# Patient Record
Sex: MALE | Race: WHITE | NOT HISPANIC OR LATINO | Employment: FULL TIME | URBAN - NONMETROPOLITAN AREA
[De-identification: names, ages, dates, MRNs, and addresses within clinical notes are randomized per-mention and may not be internally consistent; named-entity substitution may affect disease eponyms.]

---

## 2017-02-06 ENCOUNTER — DOCUMENTATION (OUTPATIENT)
Dept: UROLOGY | Facility: CLINIC | Age: 52
End: 2017-02-06

## 2017-02-06 NOTE — PROGRESS NOTES
Patients wife called and left a message requesting refills on Testosterone for the patient, upon reviewing the patients chart, he will need to make an appointment to see a provider before anymore refills can be issued on the Testosterone. I called and left a message explaining this to them and instructing them to call the office to schedule an appointment.

## 2017-02-13 PROCEDURE — 84153 ASSAY OF PSA TOTAL: CPT | Performed by: UROLOGY

## 2017-02-13 PROCEDURE — 85027 COMPLETE CBC AUTOMATED: CPT | Performed by: UROLOGY

## 2017-02-13 PROCEDURE — 84403 ASSAY OF TOTAL TESTOSTERONE: CPT | Performed by: UROLOGY

## 2017-02-14 ENCOUNTER — LAB (OUTPATIENT)
Dept: UROLOGY | Facility: CLINIC | Age: 52
End: 2017-02-14

## 2017-02-14 DIAGNOSIS — R79.89 LOW TESTOSTERONE: Primary | ICD-10-CM

## 2017-02-14 LAB — TESTOST SERPL-MCNC: 176.58 NG/DL (ref 86.98–780.1)

## 2017-02-14 PROCEDURE — 36415 COLL VENOUS BLD VENIPUNCTURE: CPT | Performed by: NURSE PRACTITIONER

## 2017-02-14 PROCEDURE — 84403 ASSAY OF TOTAL TESTOSTERONE: CPT | Performed by: NURSE PRACTITIONER

## 2017-02-15 ENCOUNTER — TRANSCRIBE ORDERS (OUTPATIENT)
Dept: INFUSION THERAPY | Facility: HOSPITAL | Age: 52
End: 2017-02-15

## 2017-02-15 DIAGNOSIS — E83.119 HEMOCHROMATOSIS, UNSPECIFIED HEMOCHROMATOSIS TYPE: Primary | ICD-10-CM

## 2017-02-16 NOTE — PROGRESS NOTES
Patient scheduled for a therapeutic phlebotomy for elevated HCT level of 55.  Tried to notify the patient yesterday evening with no answer and attempted again today .  Left message for him to return my call.

## 2017-02-20 NOTE — PROGRESS NOTES
Have attempted call patient's wife Sylwia at [phone] today with a message for her to return my call for the patient's scheduled appointment for the [facility] on 2/21/2017 at 11:00 am.  I have also attempted the call the patient's work number today and last week and have left messages regarding the appointment with no return call received.

## 2017-02-20 NOTE — PROGRESS NOTES
Patient returned called and was informed of his appointment at the Infusion Clinic for 2/21/2017 at 11:00 am.  A prescription for testosterone cypionate 200 mg/mL - to inject one mL every 3 weeks # 1 mL with no refills called to [facility] in [place], [place]

## 2017-02-21 ENCOUNTER — HOSPITAL ENCOUNTER (OUTPATIENT)
Dept: INFUSION THERAPY | Facility: HOSPITAL | Age: 52
Setting detail: INFUSION SERIES
Discharge: HOME | End: 2017-02-21
Attending: UROLOGY

## 2017-02-21 VITALS
RESPIRATION RATE: 16 BRPM | DIASTOLIC BLOOD PRESSURE: 90 MMHG | HEART RATE: 64 BPM | TEMPERATURE: 98.8 F | SYSTOLIC BLOOD PRESSURE: 126 MMHG | WEIGHT: 280 LBS | BODY MASS INDEX: 35.94 KG/M2 | HEIGHT: 74 IN

## 2017-02-21 DIAGNOSIS — E83.119 HEMOCHROMATOSIS, UNSPECIFIED HEMOCHROMATOSIS TYPE: ICD-10-CM

## 2017-02-21 LAB
FERRITIN SERPL-MCNC: 280 NG/ML (ref 21.9–321.7)
HCT VFR BLD AUTO: 54 % (ref 42–52)
HGB BLD-MCNC: 17.7 G/DL (ref 14–18)
IRON 24H UR-MRATE: 110 MCG/DL (ref 53–167)
IRON SATN MFR SERPL: 33 % (ref 20–50)
POST-BLOOD PRESSURE: NORMAL
PRE-BLOOD PRESSURE: NORMAL
PRE-HCT: 54
PRE-HGB: 17.7
PULSE: 68
TIBC SERPL-MCNC: 333 MCG/DL (ref 241–421)
VOLUME COLLECTED: 510

## 2017-02-21 PROCEDURE — 83540 ASSAY OF IRON: CPT | Performed by: UROLOGY

## 2017-02-21 PROCEDURE — 99195 PHLEBOTOMY: CPT

## 2017-02-21 PROCEDURE — 85014 HEMATOCRIT: CPT | Performed by: UROLOGY

## 2017-02-21 PROCEDURE — 85018 HEMOGLOBIN: CPT | Performed by: UROLOGY

## 2017-02-21 PROCEDURE — 82728 ASSAY OF FERRITIN: CPT | Performed by: UROLOGY

## 2017-02-21 PROCEDURE — 83550 IRON BINDING TEST: CPT | Performed by: UROLOGY

## 2017-03-31 ENCOUNTER — LAB (OUTPATIENT)
Dept: ONCOLOGY | Facility: CLINIC | Age: 52
End: 2017-03-31

## 2017-03-31 ENCOUNTER — CONSULT (OUTPATIENT)
Dept: ONCOLOGY | Facility: CLINIC | Age: 52
End: 2017-03-31

## 2017-03-31 VITALS
DIASTOLIC BLOOD PRESSURE: 71 MMHG | WEIGHT: 295 LBS | RESPIRATION RATE: 18 BRPM | OXYGEN SATURATION: 98 % | TEMPERATURE: 97.2 F | BODY MASS INDEX: 37.86 KG/M2 | HEIGHT: 74 IN | SYSTOLIC BLOOD PRESSURE: 123 MMHG | HEART RATE: 66 BPM

## 2017-03-31 DIAGNOSIS — D72.829 LEUKOCYTOSIS, UNSPECIFIED TYPE: Primary | ICD-10-CM

## 2017-03-31 DIAGNOSIS — D75.1 POLYCYTHEMIA: Primary | ICD-10-CM

## 2017-03-31 LAB
ALBUMIN SERPL-MCNC: 4.4 G/DL (ref 3.5–5)
ALBUMIN/GLOB SERPL: 1.6 G/DL (ref 1.5–2.5)
ALP SERPL-CCNC: 44 U/L (ref 40–129)
ALT SERPL W P-5'-P-CCNC: 20 U/L (ref 10–44)
ANION GAP SERPL CALCULATED.3IONS-SCNC: 5.1 MMOL/L (ref 3.6–11.2)
AST SERPL-CCNC: 19 U/L (ref 10–34)
BASOPHILS # BLD AUTO: 0.04 10*3/MM3 (ref 0–0.3)
BASOPHILS NFR BLD AUTO: 0.5 % (ref 0–2)
BILIRUB SERPL-MCNC: 0.5 MG/DL (ref 0.2–1.8)
BUN BLD-MCNC: 16 MG/DL (ref 7–21)
BUN/CREAT SERPL: 14.2 (ref 7–25)
CALCIUM SPEC-SCNC: 9.2 MG/DL (ref 7.7–10)
CHLORIDE SERPL-SCNC: 108 MMOL/L (ref 99–112)
CO2 SERPL-SCNC: 25.9 MMOL/L (ref 24.3–31.9)
CREAT BLD-MCNC: 1.13 MG/DL (ref 0.43–1.29)
DEPRECATED RDW RBC AUTO: 49.2 FL (ref 37–54)
EOSINOPHIL # BLD AUTO: 0.25 10*3/MM3 (ref 0–0.7)
EOSINOPHIL NFR BLD AUTO: 2.9 % (ref 0–5)
ERYTHROCYTE [DISTWIDTH] IN BLOOD BY AUTOMATED COUNT: 14.4 % (ref 11.5–14.5)
GFR SERPL CREATININE-BSD FRML MDRD: 68 ML/MIN/1.73
GLOBULIN UR ELPH-MCNC: 2.8 GM/DL
GLUCOSE BLD-MCNC: 131 MG/DL (ref 70–110)
HCT VFR BLD AUTO: 47.9 % (ref 42–52)
HGB BLD-MCNC: 15.7 G/DL (ref 14–18)
IMM GRANULOCYTES # BLD: 0.14 10*3/MM3 (ref 0–0.03)
IMM GRANULOCYTES NFR BLD: 1.6 % (ref 0–0.5)
LYMPHOCYTES # BLD AUTO: 2.09 10*3/MM3 (ref 1–3)
LYMPHOCYTES NFR BLD AUTO: 24.4 % (ref 21–51)
MCH RBC QN AUTO: 31 PG (ref 27–33)
MCHC RBC AUTO-ENTMCNC: 32.8 G/DL (ref 33–37)
MCV RBC AUTO: 94.5 FL (ref 80–94)
MONOCYTES # BLD AUTO: 1.05 10*3/MM3 (ref 0.1–0.9)
MONOCYTES NFR BLD AUTO: 12.2 % (ref 0–10)
NEUTROPHILS # BLD AUTO: 5.01 10*3/MM3 (ref 1.4–6.5)
NEUTROPHILS NFR BLD AUTO: 58.4 % (ref 30–70)
OSMOLALITY SERPL CALC.SUM OF ELEC: 280.5 MOSM/KG (ref 273–305)
PLATELET # BLD AUTO: 218 10*3/MM3 (ref 130–400)
PMV BLD AUTO: 10.9 FL (ref 6–10)
POTASSIUM BLD-SCNC: 3.9 MMOL/L (ref 3.5–5.3)
PROT SERPL-MCNC: 7.2 G/DL (ref 6–8)
RBC # BLD AUTO: 5.07 10*6/MM3 (ref 4.7–6.1)
SODIUM BLD-SCNC: 139 MMOL/L (ref 135–153)
WBC NRBC COR # BLD: 8.58 10*3/MM3 (ref 4.5–12.5)

## 2017-03-31 PROCEDURE — 81403 MOPATH PROCEDURE LEVEL 4: CPT | Performed by: INTERNAL MEDICINE

## 2017-03-31 PROCEDURE — 82668 ASSAY OF ERYTHROPOIETIN: CPT | Performed by: INTERNAL MEDICINE

## 2017-03-31 PROCEDURE — 99205 OFFICE O/P NEW HI 60 MIN: CPT | Performed by: INTERNAL MEDICINE

## 2017-03-31 PROCEDURE — 36415 COLL VENOUS BLD VENIPUNCTURE: CPT | Performed by: INTERNAL MEDICINE

## 2017-03-31 PROCEDURE — 80053 COMPREHEN METABOLIC PANEL: CPT | Performed by: INTERNAL MEDICINE

## 2017-03-31 PROCEDURE — 85025 COMPLETE CBC W/AUTO DIFF WBC: CPT | Performed by: INTERNAL MEDICINE

## 2017-03-31 PROCEDURE — 81270 JAK2 GENE: CPT | Performed by: INTERNAL MEDICINE

## 2017-03-31 PROCEDURE — 81219 CALR GENE COM VARIANTS: CPT | Performed by: INTERNAL MEDICINE

## 2017-03-31 PROCEDURE — 81402 MOPATH PROCEDURE LEVEL 3: CPT | Performed by: INTERNAL MEDICINE

## 2017-03-31 RX ORDER — NAPROXEN 500 MG/1
500 TABLET ORAL 2 TIMES DAILY PRN
COMMUNITY
End: 2022-08-05 | Stop reason: ALTCHOICE

## 2017-03-31 RX ORDER — LANOLIN ALCOHOL/MO/W.PET/CERES
1000 CREAM (GRAM) TOPICAL WEEKLY
COMMUNITY
End: 2018-12-31

## 2017-03-31 RX ORDER — PRASTERONE (DHEA) 50 MG
TABLET ORAL 3 TIMES WEEKLY
COMMUNITY
End: 2018-12-31

## 2017-03-31 NOTE — PROGRESS NOTES
Jose R Hilliard  [id number]  1965  3/31/2017      Referring Provider:   GABRIEL Guillen    Reason for Consultation:   Polycythemia     Chief Complaint:  Chronic joint/back pain      History of Present Illness:  Jose R Hilliard is a very pleasant 51 y.o.  male who presents in new consultation at the request of GABRIEL Guillen for further management and evaluation of polycythemia.    Mr. Hilliard was first found to have an erythrocytosis one month ago in which his hemoglobin was 17.7 with a hematocrit of 55.2. He underwent his first phlebotomy treatment on 2/21/17. He reports feeling overall better with phlebotomy. In reviewing his complete blood counts that are available to me it appears that he was also had an erythrocytosis in 2016. He denies of any tobacco abuse or being exposed to second hand tobacco smoke, but he is a  by trade and is exposed to a lot of smoke however he does where a nair. He has been taking testosterone injections and recently has been taking this more frequently. he was also recently placed on DHEA in the last two weeks. He reports that he has overall been relatively healthy with the exception of his chronic joint pain. However, in the last year he has been diagnosed with mild hypertension, hypothyroidism and has had laboratory results that have been concerning for CREST syndrome without clinical symptoms. He currently follows with a Rheumatologist. He denies of any fevers, night sweats, weight changes, or lymphadenopathy. He denies of any abnormal or spontaneous bleeding or thrombosis. He denies of any headaches, snoring, or sleep apnea symptoms.      The following portions of the patient's history were reviewed and updated as appropriate: allergies, current medications, past family history, past medical history, past social history, past surgical history and problem list.    Allergies   Allergen Reactions   • Adhesive Tape Rash     Some tapes       Past Medical History:    Diagnosis Date   • BPH (benign prostatic hypertrophy) with urinary obstruction    • Disease of thyroid gland    • History of hyperlipidemia    • History of hypertension    • History of osteoarthritis    • Hypogonadism in male        Past Surgical History:   Procedure Laterality Date   • CHOLECYSTECTOMY         Social History     Social History   • Marital status: Single     Spouse name: N/A   • Number of children: N/A   • Years of education: N/A     Occupational History   • Not on file.     Social History Main Topics   • Smoking status: Never Smoker   • Smokeless tobacco: Never Used   • Alcohol use No   • Drug use: No   • Sexual activity: Not on file     Other Topics Concern   • Not on file     Social History Narrative       Family History   Problem Relation Age of Onset   • No Known Problems Father    • No Known Problems Mother    • Stroke Paternal Grandfather    • Diabetes Paternal Grandfather          Current Outpatient Prescriptions:   •  aspirin ( ASPIRIN) 81 MG EC tablet, Take 1 tablet by mouth daily, Disp: 30 tablet, Rfl: 2  •  bisoprolol-hydrochlorothiazide (ZIAC) 5-6.25 MG per tablet, Take 1 tablet by mouth daily, Disp: 30 tablet, Rfl: 2  •  DHEA 50 MG tablet, Take  by mouth 3 (Three) Times a Week., Disp: , Rfl:   •  levothyroxine (SYNTHROID, LEVOTHROID) 88 MCG tablet, Take 1 tablet by mouth daily, Disp: 30 tablet, Rfl: 2  •  naproxen (NAPROSYN) 500 MG tablet, Take 500 mg by mouth 2 (Two) Times a Day With Meals., Disp: , Rfl:   •  pantoprazole (PROTONIX) 40 MG EC tablet, Take 1 tablet by mouth daily, Disp: 30 tablet, Rfl: 2  •  Testosterone Cypionate (DEPOTESTOTERONE CYPIONATE) 200 MG/ML injection, Inject 1 mL intramuscularly every 3 weeks., Disp: 1 mL, Rfl: 3  •  vitamin B-12 (CYANOCOBALAMIN) 1000 MCG tablet, Take 1,000 mcg by mouth 1 (One) Time Per Week., Disp: , Rfl:         Review of Systems  Constitutional: No fever, chills, night sweats or weight loss.   HEENT:  No headaches, vision changes or  hearing changes, +sinus drainage, no sore throat.   Cardiovascular:  No palpitations, chest pain, syncopal episodes or edema.   Pulmonary:  No shortness of breath, hemoptysis, or cough.   Gastrointestinal:  No nausea or vomiting.  No constipation or diarrhea. No change in appetite.No abdominal pain. No melena or hematochezia.   Genitourinary:  No hematuria, or changes in urination.   Musculoskeletal:  +chronic joint problems and pain.   Skin: No rashes or pruritus.   Endocrine:  No hot flashes or chills   Hematologic:  No history of free bleeding, spontaneous bleeding or clotting problems. No lymphadenopathy.    Immunologic:  + seasonal allergies or no frequent infections.   Neurologic: +numbness, tingling, np weakness.   Psychiatric:  No anxiety or depression.       Physical Exam  Vital Signs: These were reviewed and listed as per patient’s electronic medical chart  Vitals:    03/31/17 1230   BP: 123/71   Pulse: 66   Resp: 18   Temp: 97.2 °F (36.2 °C)   SpO2: 98%     General: Awake, alert and oriented, in no distress  HEENT: Head is atraumatic, normocephalic, extraocular movements full, oropharynx clear, no scleral icterus, pink moist mucous membranes  Neck: supple, no jvd, lymphadenopathy or masses  Cardiovascular: regular rate and rhythm without murmurs, rubs or gallops  Pulmonary: non-labored, clear to auscultation bilaterally, no wheezing  Abdomen: soft, non-tender, non-distended, normal active bowel sounds present, no organomegaly  Extremities: No clubbing, cyanosis or edema  Lymph: No cervical, supraclavicular, axillary, adenopathy  Neurologic: Mental status as above, alert, awake and oriented, grossly non-focal exam  Skin: warm, dry, intact        Labs / Studies:    Consult on 03/31/2017   Component Date Value   • WBC 03/31/2017 8.58    • RBC 03/31/2017 5.07    • Hemoglobin 03/31/2017 15.7    • Hematocrit 03/31/2017 47.9    • MCV 03/31/2017 94.5*   • MCH 03/31/2017 31.0    • MCHC 03/31/2017 32.8*   • RDW  03/31/2017 14.4    • RDW-SD 03/31/2017 49.2    • MPV 03/31/2017 10.9*   • Platelets 03/31/2017 218    • Neutrophil % 03/31/2017 58.4    • Lymphocyte % 03/31/2017 24.4    • Monocyte % 03/31/2017 12.2*   • Eosinophil % 03/31/2017 2.9    • Basophil % 03/31/2017 0.5    • Immature Grans % 03/31/2017 1.6*   • Neutrophils, Absolute 03/31/2017 5.01    • Lymphocytes, Absolute 03/31/2017 2.09    • Monocytes, Absolute 03/31/2017 1.05*   • Eosinophils, Absolute 03/31/2017 0.25    • Basophils, Absolute 03/31/2017 0.04    • Immature Grans, Absolute 03/31/2017 0.14*   Hospital Outpatient Visit on 02/21/2017   Component Date Value   • Hemoglobin 02/21/2017 17.7    • Hematocrit 02/21/2017 54.0*   • Iron 02/21/2017 110    • TIBC 02/21/2017 333    • Iron Saturation 02/21/2017 33    • Ferritin 02/21/2017 280.00    • Pulse 02/21/2017 68    • Volume Collected 02/21/2017 510    • Pre-Hgb 02/21/2017 17.7    • Pre-Hct 02/21/2017 54.0    • Pre-Blood Pressure 02/21/2017 137/77    • Post-Blood Pressure 02/21/2017 126/90    Lab on 02/14/2017   Component Date Value   • Testosterone, Total 02/14/2017 176.58    Lab on 02/13/2017   Component Date Value   • PSA 02/13/2017 4.490*   • Testosterone, Total 02/13/2017 210.86    • WBC 02/13/2017 9.47    • RBC 02/13/2017 5.82    • Hemoglobin 02/13/2017 17.9    • Hematocrit 02/13/2017 55.2*   • MCV 02/13/2017 94.8*   • MCH 02/13/2017 30.8    • MCHC 02/13/2017 32.4*   • RDW 02/13/2017 14.6*   • RDW-SD 02/13/2017 48.5    • MPV 02/13/2017 10.9*   • Platelets 02/13/2017 222         * Cannot find OR log *       Assessment/Plan   Jose R Hilliard is a very pleasant 51 y.o.  male who presents in new consultation at the request of GABRIEL Guillen for further management and evaluation of polycythemia.    Polycythemia  Given his significant elevation in hemoglobin and hematocrit I will start by obtaining a [id number] mutation and erythropoietin level. If [id number] mutation and erythropoietin results are  unrevealing, will obtain JAK2 exon 12 mutation and BCR ABL PCR. Possible polycythemia vera diagnosis as well as prognosis, and treatment were discussed with the patient. He understands that if he does have polycythemia vera he is at increased risk for thrombosis as well as bleeding, hematological malignancies and post-PV myelofibrosis. However with that being said I believe that he more likely then not has a secondary polycythemia which is being caused by his ongoing testosterone use. He last received phlebotomy one month ago and today his hematocrit level does not require phlebotomy. I will continue with phlebotomy for one unit (500 mL) to be taken every 1 month with goal of Hct  <50% if patient is positive for polycythemia vera will then increase goals to Hct <45%.      Possible CREST syndrome  He is currently following with a Rheumatologist, although he reports clinically he has not manifested symptoms despite laboratory evaluation that may be consistent with CREST.    I will have the patient return in follow up appointment to review test results in one month and for repeat phlebotomy. He understands that should he have any questions or concerns prior to his appointment he should give us a call at any time and I would be happy to see him sooner. It was a pleasure to see this patient in clinic today, thank you for allowing me to participate in the care of this patient.    I spent 60 minutes in regards to this patient’s care today. More than 36 minutes of the time was spent in direct interaction with the patient and his discussing the above problems and answering their questions.        Yudy Dietz MD  03/31/2017  1:38 PM

## 2017-04-03 LAB — ETHNIC BACKGROUND STATED: 32.9 MIU/ML (ref 2.6–18.5)

## 2017-04-13 LAB
CALR EXON 9 MUT ANL BLD/T: NORMAL
JAK2 EXON 12 MUT ANL BLD/T: NORMAL
JAK2 EXON 12 MUT TESTED BLD/T: NORMAL
JAK2 P.V617F BLD/T QL: NORMAL
LAB DIRECTOR NAME PROVIDER: NORMAL
Lab: NORMAL
MPL MUTATION ANALYSIS RESULT:: NORMAL
REF LAB TEST METHOD: NORMAL
REF LAB TEST METHOD: NORMAL
REFERENCE: NORMAL
REFLEX: NORMAL
SERVICE CMNT-IMP: NORMAL

## 2017-07-28 ENCOUNTER — TRANSCRIBE ORDERS (OUTPATIENT)
Dept: LAB | Facility: HOSPITAL | Age: 52
End: 2017-07-28

## 2017-07-28 ENCOUNTER — HOSPITAL ENCOUNTER (OUTPATIENT)
Dept: GENERAL RADIOLOGY | Facility: HOSPITAL | Age: 52
Discharge: HOME | End: 2017-07-28
Admitting: NURSE PRACTITIONER

## 2017-07-28 DIAGNOSIS — M54.5 LOW BACK PAIN, UNSPECIFIED BACK PAIN LATERALITY, UNSPECIFIED CHRONICITY, WITH SCIATICA PRESENCE UNSPECIFIED: Primary | ICD-10-CM

## 2017-07-28 DIAGNOSIS — M54.5 LOW BACK PAIN, UNSPECIFIED BACK PAIN LATERALITY, UNSPECIFIED CHRONICITY, WITH SCIATICA PRESENCE UNSPECIFIED: ICD-10-CM

## 2017-07-28 PROCEDURE — 72110 X-RAY EXAM L-2 SPINE 4/>VWS: CPT | Performed by: RADIOLOGY

## 2017-07-28 PROCEDURE — 72110 X-RAY EXAM L-2 SPINE 4/>VWS: CPT

## 2017-08-18 ENCOUNTER — TRANSCRIBE ORDERS (OUTPATIENT)
Dept: ADMINISTRATIVE | Facility: HOSPITAL | Age: 52
End: 2017-08-18

## 2017-08-18 DIAGNOSIS — R13.10 DYSPHAGIA, UNSPECIFIED TYPE: ICD-10-CM

## 2017-08-18 DIAGNOSIS — E03.9 HYPOTHYROIDISM, UNSPECIFIED TYPE: Primary | ICD-10-CM

## 2017-08-30 ENCOUNTER — APPOINTMENT (OUTPATIENT)
Dept: ULTRASOUND IMAGING | Facility: HOSPITAL | Age: 52
End: 2017-08-30

## 2017-09-08 ENCOUNTER — HOSPITAL ENCOUNTER (OUTPATIENT)
Dept: ULTRASOUND IMAGING | Facility: HOSPITAL | Age: 52
Discharge: HOME | End: 2017-09-08
Admitting: NURSE PRACTITIONER

## 2017-09-08 DIAGNOSIS — R13.10 DYSPHAGIA, UNSPECIFIED TYPE: ICD-10-CM

## 2017-09-08 DIAGNOSIS — E03.9 HYPOTHYROIDISM, UNSPECIFIED TYPE: ICD-10-CM

## 2017-09-08 PROCEDURE — 76536 US EXAM OF HEAD AND NECK: CPT | Performed by: RADIOLOGY

## 2017-09-08 PROCEDURE — 76536 US EXAM OF HEAD AND NECK: CPT

## 2018-08-31 ENCOUNTER — HOSPITAL ENCOUNTER (OUTPATIENT)
Dept: RESPIRATORY THERAPY | Facility: HOSPITAL | Age: 53
Discharge: HOME | End: 2018-08-31

## 2018-08-31 ENCOUNTER — TRANSCRIBE ORDERS (OUTPATIENT)
Dept: LAB | Facility: HOSPITAL | Age: 53
End: 2018-08-31

## 2018-08-31 ENCOUNTER — HOSPITAL ENCOUNTER (OUTPATIENT)
Dept: GENERAL RADIOLOGY | Facility: HOSPITAL | Age: 53
Discharge: HOME | End: 2018-08-31
Admitting: NURSE PRACTITIONER

## 2018-08-31 DIAGNOSIS — K21.9 CARDIOCHALASIA: ICD-10-CM

## 2018-08-31 DIAGNOSIS — M54.40 LOW BACK PAIN WITH SCIATICA, SCIATICA LATERALITY UNSPECIFIED, UNSPECIFIED BACK PAIN LATERALITY, UNSPECIFIED CHRONICITY: Primary | ICD-10-CM

## 2018-08-31 DIAGNOSIS — I10 ESSENTIAL HYPERTENSION, BENIGN: ICD-10-CM

## 2018-08-31 DIAGNOSIS — M54.40 LOW BACK PAIN WITH SCIATICA, SCIATICA LATERALITY UNSPECIFIED, UNSPECIFIED BACK PAIN LATERALITY, UNSPECIFIED CHRONICITY: ICD-10-CM

## 2018-08-31 DIAGNOSIS — I10 ESSENTIAL HYPERTENSION, BENIGN: Primary | ICD-10-CM

## 2018-08-31 PROCEDURE — 72110 X-RAY EXAM L-2 SPINE 4/>VWS: CPT | Performed by: RADIOLOGY

## 2018-08-31 PROCEDURE — 93226 XTRNL ECG REC<48 HR SCAN A/R: CPT

## 2018-08-31 PROCEDURE — 72110 X-RAY EXAM L-2 SPINE 4/>VWS: CPT

## 2018-08-31 PROCEDURE — 93225 XTRNL ECG REC<48 HRS REC: CPT

## 2018-09-04 PROCEDURE — 93227 XTRNL ECG REC<48 HR R&I: CPT | Performed by: INTERNAL MEDICINE

## 2018-12-28 ENCOUNTER — TRANSCRIBE ORDERS (OUTPATIENT)
Dept: INFUSION THERAPY | Facility: HOSPITAL | Age: 53
End: 2018-12-28

## 2018-12-28 DIAGNOSIS — D75.1 POLYCYTHEMIA: Primary | ICD-10-CM

## 2018-12-31 ENCOUNTER — HOSPITAL ENCOUNTER (OUTPATIENT)
Dept: INFUSION THERAPY | Facility: HOSPITAL | Age: 53
Discharge: HOME | End: 2018-12-31
Admitting: NURSE PRACTITIONER

## 2018-12-31 VITALS
RESPIRATION RATE: 20 BRPM | HEART RATE: 59 BPM | SYSTOLIC BLOOD PRESSURE: 119 MMHG | DIASTOLIC BLOOD PRESSURE: 82 MMHG | TEMPERATURE: 98.6 F

## 2018-12-31 DIAGNOSIS — D75.1 POLYCYTHEMIA: ICD-10-CM

## 2018-12-31 LAB
HCT VFR BLD AUTO: 46.6 % (ref 42–52)
HGB BLD-MCNC: 15.7 G/DL (ref 14–18)

## 2018-12-31 PROCEDURE — 85018 HEMOGLOBIN: CPT | Performed by: NURSE PRACTITIONER

## 2018-12-31 PROCEDURE — 36415 COLL VENOUS BLD VENIPUNCTURE: CPT

## 2018-12-31 PROCEDURE — 85014 HEMATOCRIT: CPT | Performed by: NURSE PRACTITIONER

## 2018-12-31 RX ORDER — PHENOL 1.4 %
600 AEROSOL, SPRAY (ML) MUCOUS MEMBRANE DAILY
COMMUNITY
End: 2022-08-05 | Stop reason: ALTCHOICE

## 2020-10-16 ENCOUNTER — TRANSCRIBE ORDERS (OUTPATIENT)
Dept: ADMINISTRATIVE | Facility: HOSPITAL | Age: 55
End: 2020-10-16

## 2020-10-16 ENCOUNTER — LAB (OUTPATIENT)
Dept: LAB | Facility: HOSPITAL | Age: 55
End: 2020-10-16

## 2020-10-16 DIAGNOSIS — M79.10 MYALGIA: ICD-10-CM

## 2020-10-16 DIAGNOSIS — D89.89 RADIATION CHIMERA (HCC): ICD-10-CM

## 2020-10-16 DIAGNOSIS — M54.50 LOW BACK PAIN, UNSPECIFIED BACK PAIN LATERALITY, UNSPECIFIED CHRONICITY, UNSPECIFIED WHETHER SCIATICA PRESENT: ICD-10-CM

## 2020-10-16 DIAGNOSIS — D89.89 RADIATION CHIMERA (HCC): Primary | ICD-10-CM

## 2020-10-16 PROCEDURE — 84550 ASSAY OF BLOOD/URIC ACID: CPT

## 2020-10-16 PROCEDURE — 36415 COLL VENOUS BLD VENIPUNCTURE: CPT | Performed by: INTERNAL MEDICINE

## 2020-10-16 PROCEDURE — 86140 C-REACTIVE PROTEIN: CPT | Performed by: INTERNAL MEDICINE

## 2020-10-16 PROCEDURE — 82550 ASSAY OF CK (CPK): CPT

## 2020-10-16 PROCEDURE — 85652 RBC SED RATE AUTOMATED: CPT | Performed by: INTERNAL MEDICINE

## 2020-10-16 PROCEDURE — 81374 HLA I TYPING 1 ANTIGEN LR: CPT

## 2020-10-17 LAB
CK SERPL-CCNC: 168 U/L (ref 20–200)
CRP SERPL-MCNC: 0.59 MG/DL (ref 0–0.5)
ERYTHROCYTE [SEDIMENTATION RATE] IN BLOOD: 5 MM/HR (ref 0–20)
URATE SERPL-MCNC: 7.2 MG/DL (ref 3.4–7)

## 2020-10-22 LAB — HLA-B27 QL NAA+PROBE: NEGATIVE

## 2021-06-11 ENCOUNTER — LAB (OUTPATIENT)
Dept: LAB | Facility: HOSPITAL | Age: 56
End: 2021-06-11

## 2021-06-11 ENCOUNTER — TRANSCRIBE ORDERS (OUTPATIENT)
Dept: LAB | Facility: HOSPITAL | Age: 56
End: 2021-06-11

## 2021-06-11 DIAGNOSIS — M05.79 SEROPOSITIVE RHEUMATOID ARTHRITIS OF MULTIPLE SITES (HCC): ICD-10-CM

## 2021-06-11 DIAGNOSIS — M05.79 SEROPOSITIVE RHEUMATOID ARTHRITIS OF MULTIPLE SITES (HCC): Primary | ICD-10-CM

## 2021-06-11 LAB
CRP SERPL-MCNC: 0.69 MG/DL (ref 0–0.5)
ERYTHROCYTE [SEDIMENTATION RATE] IN BLOOD: 26 MM/HR (ref 0–20)
URATE SERPL-MCNC: 7.7 MG/DL (ref 3.4–7)

## 2021-06-11 PROCEDURE — 86140 C-REACTIVE PROTEIN: CPT

## 2021-06-11 PROCEDURE — 85652 RBC SED RATE AUTOMATED: CPT

## 2021-06-11 PROCEDURE — 84550 ASSAY OF BLOOD/URIC ACID: CPT

## 2021-06-11 PROCEDURE — 36415 COLL VENOUS BLD VENIPUNCTURE: CPT

## 2021-08-24 DIAGNOSIS — M25.512 LEFT SHOULDER PAIN, UNSPECIFIED CHRONICITY: Primary | ICD-10-CM

## 2021-08-27 ENCOUNTER — HOSPITAL ENCOUNTER (OUTPATIENT)
Dept: GENERAL RADIOLOGY | Facility: HOSPITAL | Age: 56
Discharge: HOME | End: 2021-08-27
Admitting: PHYSICIAN ASSISTANT

## 2021-08-27 ENCOUNTER — OFFICE VISIT (OUTPATIENT)
Dept: ORTHOPEDIC SURGERY | Facility: CLINIC | Age: 56
End: 2021-08-27

## 2021-08-27 VITALS
HEART RATE: 67 BPM | DIASTOLIC BLOOD PRESSURE: 93 MMHG | BODY MASS INDEX: 37.91 KG/M2 | SYSTOLIC BLOOD PRESSURE: 153 MMHG | WEIGHT: 295.42 LBS | HEIGHT: 74 IN

## 2021-08-27 DIAGNOSIS — M25.512 LEFT SHOULDER PAIN, UNSPECIFIED CHRONICITY: ICD-10-CM

## 2021-08-27 DIAGNOSIS — M75.42 IMPINGEMENT SYNDROME OF LEFT SHOULDER: Primary | ICD-10-CM

## 2021-08-27 PROCEDURE — 99203 OFFICE O/P NEW LOW 30 MIN: CPT | Performed by: PHYSICIAN ASSISTANT

## 2021-08-27 PROCEDURE — 73030 X-RAY EXAM OF SHOULDER: CPT | Performed by: RADIOLOGY

## 2021-08-27 PROCEDURE — 73030 X-RAY EXAM OF SHOULDER: CPT

## 2021-08-27 PROCEDURE — 20610 DRAIN/INJ JOINT/BURSA W/O US: CPT | Performed by: PHYSICIAN ASSISTANT

## 2021-08-27 RX ORDER — GABAPENTIN 600 MG/1
TABLET ORAL
COMMUNITY
Start: 2021-08-20 | End: 2023-01-13 | Stop reason: SDUPTHER

## 2021-08-27 RX ORDER — LEFLUNOMIDE 20 MG/1
20 TABLET ORAL DAILY
COMMUNITY
Start: 2021-08-11

## 2021-08-27 RX ORDER — MELOXICAM 7.5 MG/1
TABLET ORAL
COMMUNITY
Start: 2021-07-19 | End: 2022-03-11 | Stop reason: ALTCHOICE

## 2021-08-27 RX ORDER — LIDOCAINE HYDROCHLORIDE 10 MG/ML
5 INJECTION, SOLUTION EPIDURAL; INFILTRATION; INTRACAUDAL; PERINEURAL
Status: COMPLETED | OUTPATIENT
Start: 2021-08-27 | End: 2021-08-27

## 2021-08-27 RX ORDER — METHYLPREDNISOLONE ACETATE 80 MG/ML
80 INJECTION, SUSPENSION INTRA-ARTICULAR; INTRALESIONAL; INTRAMUSCULAR; SOFT TISSUE
Status: COMPLETED | OUTPATIENT
Start: 2021-08-27 | End: 2021-08-27

## 2021-08-27 RX ADMIN — METHYLPREDNISOLONE ACETATE 80 MG: 80 INJECTION, SUSPENSION INTRA-ARTICULAR; INTRALESIONAL; INTRAMUSCULAR; SOFT TISSUE at 10:57

## 2021-08-27 RX ADMIN — LIDOCAINE HYDROCHLORIDE 5 ML: 10 INJECTION, SOLUTION EPIDURAL; INFILTRATION; INTRACAUDAL; PERINEURAL at 10:57

## 2021-08-27 NOTE — PROGRESS NOTES
[facility]          Patient: Jose R Hilliard  YOB: 1965  Date of Encounter: 08/27/2021  PCP: Edith Sloan APRN      Subjective     Chief Complaint   Patient presents with   • Left Shoulder - Pain, Initial Evaluation           History of Present Illness:     Jose R Hilliard is a 56 y.o. male presents today as result of left shoulder pain ongoing for several years.  Patient states that he was told that he had shoulder instability in the past.  The patient reports that he has attempted anti-inflammatory medication with naproxen, meloxicam with ongoing lateral and anterior shoulder pain worse upon attempted range of motion or overhead range of motion.  Patient reports weakness upon abduction of the shoulder.  He does not recall specific injury however his work occupation is very physical.  Patient describes difficulty upon rotation of the shoulder and describes dull throbbing aching sensation at rest with sharp stabbing sensation radiating down to the mid upper arm upon attempted range of motion.  Patient denies any current paresthesias.        There is no problem list on file for this patient.    Past Medical History:   Diagnosis Date   • BPH (benign prostatic hypertrophy) with urinary obstruction    • Disease of thyroid gland    • Goiter    • History of hyperlipidemia    • History of hypertension    • History of osteoarthritis    • Hypogonadism in male    • Polycythemia      Past Surgical History:   Procedure Laterality Date   • CHOLECYSTECTOMY       Social History     Occupational History   • Not on file   Tobacco Use   • Smoking status: Never Smoker   • Smokeless tobacco: Never Used   Substance and Sexual Activity   • Alcohol use: No   • Drug use: No   • Sexual activity: Not on file    Jose R Hilliard  reports that he has never smoked. He has never used smokeless tobacco.. I have educated him on the risk of diseases from using tobacco products such as cancer, COPD and heart  disease.          Social History     Social History Narrative   • Not on file     Family History   Problem Relation Age of Onset   • No Known Problems Father    • No Known Problems Mother    • Stroke Paternal Grandfather    • Diabetes Paternal Grandfather      Current Outpatient Medications   Medication Sig Dispense Refill   • aspirin (KP ASPIRIN) 81 MG EC tablet Take 1 tablet by mouth daily 30 tablet 2   • bisoprolol-hydrochlorothiazide (ZIAC) 5-6.25 MG per tablet Take 1 tablet by mouth daily 30 tablet 2   • gabapentin (NEURONTIN) 600 MG tablet      • leflunomide (ARAVA) 10 MG tablet      • levothyroxine (SYNTHROID, LEVOTHROID) 88 MCG tablet Take 1 tablet by mouth daily 30 tablet 2   • magnesium oxide (MAGOX) 400 (241.3 Mg) MG tablet tablet Take 400 mg by mouth Daily.     • meloxicam (MOBIC) 7.5 MG tablet      • naproxen (NAPROSYN) 500 MG tablet Take 500 mg by mouth 2 (Two) Times a Day As Needed.     • Testosterone Cypionate (DEPOTESTOTERONE CYPIONATE) 200 MG/ML injection Inject 1 mL intramuscularly every 3 weeks. (Patient taking differently: Inject 200 mg into the appropriate muscle as directed by prescriber. Hasn't been taking it currently) 1 mL 3   • calcium carbonate (OS-YUNIEL) 600 MG tablet Take 600 mg by mouth Daily.       No current facility-administered medications for this visit.     Allergies   Allergen Reactions   • Adhesive Tape Rash     Some tapes            Review of Systems   Constitutional: Negative.   HENT: Negative.         Sinus pain   Eyes: Negative.    Cardiovascular: Negative.    Respiratory: Negative.    Endocrine: Negative.    Hematologic/Lymphatic: Negative.    Skin: Negative.    Musculoskeletal: Positive for back pain, joint pain, joint swelling and neck pain.        Pertinent positives listed in HPI   Gastrointestinal: Negative.    Genitourinary: Negative.    Neurological: Negative.    Psychiatric/Behavioral: Negative.    Allergic/Immunologic: Negative.          Objective      Vitals:     "08/27/21 0957   BP: 153/93   Pulse: 67   Weight: 134 kg (295 lb 6.7 oz)   Height: 188 cm (74.02\")      Patient's Body mass index is 37.91 kg/m². indicating that he is obese (BMI >30). Obesity-related health conditions include the following: Listed in PMH. Obesity is unchanged. BMI is is above average; BMI management plan is completed. We discussed portion control and increasing exercise..      Physical Exam  Vitals and nursing note reviewed.   Constitutional:       General: He is not in acute distress.     Appearance: Normal appearance. He is not ill-appearing.   HENT:      Head: Normocephalic and atraumatic.      Right Ear: External ear normal.      Left Ear: External ear normal.      Nose: Nose normal.      Mouth/Throat:      Mouth: Mucous membranes are moist.      Pharynx: Oropharynx is clear.   Eyes:      Extraocular Movements: Extraocular movements intact.      Conjunctiva/sclera: Conjunctivae normal.      Pupils: Pupils are equal, round, and reactive to light.   Cardiovascular:      Rate and Rhythm: Normal rate.      Pulses: Normal pulses.   Pulmonary:      Effort: Pulmonary effort is normal.   Abdominal:      General: There is no distension.   Musculoskeletal:      Cervical back: Normal range of motion. No rigidity.      Comments: Examination of the patient's left shoulder reveals pain to palpation to the anterior bicipital groove as well as subacromially.  Patient has AC joint tenderness palpation.  Forward flexion/elevation 100 degrees.  Abduction 70 degrees.  Internal rotation to the iliac crest.  Patient has painful and weak Jobes maneuver with empty can test positive.  Gee and Neer's painful.   Skin:     General: Skin is warm and dry.      Capillary Refill: Capillary refill takes less than 2 seconds.   Neurological:      General: No focal deficit present.      Mental Status: He is alert and oriented to person, place, and time.      Cranial Nerves: Cranial nerves are intact.   Psychiatric:         " Mood and Affect: Mood normal.         Behavior: Behavior normal.                 Radiology:      XR Shoulder 2+ View Left    Result Date: 8/27/2021  Negative plain films of left shoulder  This report was finalized on 8/27/2021 10:23 AM by Dr. Bolivar Castillo II, MD.              Assessment/Plan        ICD-10-CM ICD-9-CM   1. Impingement syndrome of left shoulder  M75.42 726.2       56-year-old male with longstanding chronic left shoulder pain with recent exacerbation injury following fall injury approximately 1 month ago.  Secondary to the patient's pain and symptoms there is concern for rotator cuff pathology.  Uncertainty in reference to subacute versus acute nature.  As result the patient was provided with a diagnostic and therapeutic injection subacromially with Depo-Medrol and lidocaine block into the subacromial space of the left shoulder.  The patient tolerated the procedure well.  He was instructed to return back in 3 weeks for further evaluation.  If no significant improvement or continued weakness the patient will undergo diagnostic MRI.      Large Joint Arthrocentesis: L subacromial bursa  Date/Time: 8/27/2021 10:57 AM  Consent given by: patient  Site marked: site marked  Supporting Documentation  Indications: pain and diagnostic evaluation   Procedure Details  Location: shoulder - L subacromial bursa  Needle size: 25 G  Approach: lateral  Medications administered: 80 mg methylPREDNISolone acetate 80 MG/ML; 5 mL lidocaine PF 1% 1 %  Patient tolerance: patient tolerated the procedure well with no immediate complications                      This document was signed by Jay Roy PA-C August 27, 2021     CC: Edith Sloan APRN EMR Dragon/Transcription disclaimer:  Part of this note may be completed utilizing the dragon speech recognition software. This electronic transcription/translation of spoken language to printed text may contain grammatical errors, random word insertions, pronoun errors,  and incomplete sentences or occasional consequences of the system due to software limitations, ambient noise, and hardware issues.  Any questions or concerns about the content, text, or information contained within the body of this dictation should be directly addressed to the physician for clarification.

## 2021-09-24 ENCOUNTER — OFFICE VISIT (OUTPATIENT)
Dept: ORTHOPEDIC SURGERY | Facility: CLINIC | Age: 56
End: 2021-09-24

## 2021-09-24 VITALS — BODY MASS INDEX: 37.86 KG/M2 | TEMPERATURE: 97.3 F | HEIGHT: 74 IN | WEIGHT: 295 LBS

## 2021-09-24 DIAGNOSIS — M75.42 IMPINGEMENT SYNDROME OF LEFT SHOULDER: Primary | ICD-10-CM

## 2021-09-24 DIAGNOSIS — M25.512 LEFT SHOULDER PAIN, UNSPECIFIED CHRONICITY: ICD-10-CM

## 2021-09-24 PROCEDURE — 99213 OFFICE O/P EST LOW 20 MIN: CPT | Performed by: PHYSICIAN ASSISTANT

## 2021-09-24 NOTE — PROGRESS NOTES
[facility] [place] [place]          Patient: Jose R Hilliard  YOB: 1965  Date of Encounter: 09/24/2021  PCP: Edith Sloan APRN      Subjective     Chief Complaint   Patient presents with   • Left Shoulder - Follow-up, Pain           History of Present Illness:     Jose R Hilliard is a 56 y.o. male presents today secondary to continuation of left shoulder pain several years duration.  Last visit patient received subacromial injection with noticeable improvement.  Patient only has mild pain upon certain anterior elevation and abduction exercises.  Majority of the pain at rest has decreased.  Patient also states that he has been out of his anti-inflammatory Acacian for last week and has noticed a change in this.  Patient again does not call specific injury.  His work occupation is very physical and this is not changed.  Patient has attempted active modification.  He reports reduction of sharp stabbing sensation however he still has the pain to the lateral aspect of shoulder radiating to the mid upper arm.  No new complaints.  Denies any paresthesias.            There is no problem list on file for this patient.    Past Medical History:   Diagnosis Date   • BPH (benign prostatic hypertrophy) with urinary obstruction    • Disease of thyroid gland    • Goiter    • History of hyperlipidemia    • History of hypertension    • History of osteoarthritis    • Hypogonadism in male    • Polycythemia      Past Surgical History:   Procedure Laterality Date   • CHOLECYSTECTOMY       Social History     Occupational History   • Not on file   Tobacco Use   • Smoking status: Never Smoker   • Smokeless tobacco: Never Used   Vaping Use   • Vaping Use: Never used   Substance and Sexual Activity   • Alcohol use: No   • Drug use: No   • Sexual activity: Not on file    Jose R Hilliard  reports that he has never smoked. He has never used smokeless tobacco.. I have educated him on the risk of diseases from using  tobacco products such as cancer, COPD and heart disease.          Social History     Social History Narrative   • Not on file     Family History   Problem Relation Age of Onset   • No Known Problems Father    • No Known Problems Mother    • Stroke Paternal Grandfather    • Diabetes Paternal Grandfather      Current Outpatient Medications   Medication Sig Dispense Refill   • aspirin (KP ASPIRIN) 81 MG EC tablet Take 1 tablet by mouth daily 30 tablet 2   • bisoprolol-hydrochlorothiazide (ZIAC) 5-6.25 MG per tablet Take 1 tablet by mouth daily 30 tablet 2   • calcium carbonate (OS-YUNIEL) 600 MG tablet Take 600 mg by mouth Daily.     • gabapentin (NEURONTIN) 600 MG tablet      • leflunomide (ARAVA) 10 MG tablet      • levothyroxine (SYNTHROID, LEVOTHROID) 88 MCG tablet Take 1 tablet by mouth daily 30 tablet 2   • magnesium oxide (MAGOX) 400 (241.3 Mg) MG tablet tablet Take 400 mg by mouth Daily.     • meloxicam (MOBIC) 7.5 MG tablet      • naproxen (NAPROSYN) 500 MG tablet Take 500 mg by mouth 2 (Two) Times a Day As Needed.     • Testosterone Cypionate (DEPOTESTOTERONE CYPIONATE) 200 MG/ML injection Inject 1 mL intramuscularly every 3 weeks. (Patient taking differently: Inject 200 mg into the appropriate muscle as directed by prescriber. Hasn't been taking it currently) 1 mL 3     No current facility-administered medications for this visit.     Allergies   Allergen Reactions   • Adhesive Tape Rash     Some tapes            Review of Systems   Constitutional: Negative.   HENT: Negative.         Sinus pain   Eyes: Negative.    Cardiovascular: Negative.    Respiratory: Negative.    Endocrine: Negative.    Hematologic/Lymphatic: Negative.    Skin: Negative.    Musculoskeletal: Positive for back pain, joint pain, joint swelling and neck pain.        Pertinent positives listed in HPI   Gastrointestinal: Negative.    Genitourinary: Negative.    Neurological: Negative.    Psychiatric/Behavioral: Negative.    Allergic/Immunologic:  "Negative.          Objective      Vitals:    09/24/21 0917   Temp: 97.3 °F (36.3 °C)   Weight: 134 kg (295 lb)   Height: 188 cm (74\")      Patient's Body mass index is 37.88 kg/m². indicating that he is obese (BMI >30). Obesity-related health conditions include the following: Listed in PMH. Obesity is unchanged. BMI is is above average; BMI management plan is completed. We discussed portion control and increasing exercise..      Physical Exam  Vitals and nursing note reviewed.   Constitutional:       General: He is not in acute distress.     Appearance: Normal appearance. He is not ill-appearing.   HENT:      Head: Normocephalic and atraumatic.      Right Ear: External ear normal.      Left Ear: External ear normal.      Nose: Nose normal.      Mouth/Throat:      Mouth: Mucous membranes are moist.      Pharynx: Oropharynx is clear.   Eyes:      Extraocular Movements: Extraocular movements intact.      Conjunctiva/sclera: Conjunctivae normal.      Pupils: Pupils are equal, round, and reactive to light.   Cardiovascular:      Rate and Rhythm: Normal rate.      Pulses: Normal pulses.   Pulmonary:      Effort: Pulmonary effort is normal.   Abdominal:      General: There is no distension.   Musculoskeletal:      Cervical back: Normal range of motion. No rigidity.      Comments: Examination today the patient's left shoulder reveals that he has improvement with subacromial tenderness to palpation.  Patient has adequate forward elevation with pain at 90 degrees.  Abduction produces mild pain at 90 degrees.  No pain upon internal rotation at side.  Once again the patient continues to exhibit intact strength with Jobes maneuver and speeds test.  Gee and Neer's produce no pain today.  Neurovascular status grossly intact left upper extremity.     Skin:     General: Skin is warm and dry.      Capillary Refill: Capillary refill takes less than 2 seconds.   Neurological:      General: No focal deficit present.      Mental " Status: He is alert and oriented to person, place, and time.      Cranial Nerves: Cranial nerves are intact.   Psychiatric:         Mood and Affect: Mood normal.         Behavior: Behavior normal.                 Radiology:      XR Shoulder 2+ View Left    Result Date: 8/27/2021  Negative plain films of left shoulder  This report was finalized on 8/27/2021 10:23 AM by Dr. Bolivar Castillo II, MD.                Assessment/Plan        ICD-10-CM ICD-9-CM   1. Impingement syndrome of left shoulder  M75.42 726.2   2. Left shoulder pain, unspecified chronicity  M25.512 719.41       Further discussions have the patient today and with the notable improvement with subacromial injection as well as discontinuation of the previous incident the patient was instructed to return back and begin implementation of the meloxicam.  As result of this we will forego repeat injection over the next several weeks to see the response with return back to the NSAID medication.  He will continue with the activity modification and will return back on as-needed basis upon any further complication or return of pain/symptoms.                      This document was signed by Jay Roy PA-C September 24, 2021     CC: Edith Sloan APRN EMR Dragon/Transcription disclaimer:  Part of this note may be completed utilizing the dragon speech recognition software. This electronic transcription/translation of spoken language to printed text may contain grammatical errors, random word insertions, pronoun errors, and incomplete sentences or occasional consequences of the system due to software limitations, ambient noise, and hardware issues.  Any questions or concerns about the content, text, or information contained within the body of this dictation should be directly addressed to the physician for clarification.

## 2021-10-22 ENCOUNTER — OFFICE VISIT (OUTPATIENT)
Dept: ORTHOPEDIC SURGERY | Facility: CLINIC | Age: 56
End: 2021-10-22

## 2021-10-22 ENCOUNTER — HOSPITAL ENCOUNTER (OUTPATIENT)
Dept: GENERAL RADIOLOGY | Facility: HOSPITAL | Age: 56
Discharge: HOME | End: 2021-10-22
Admitting: PHYSICIAN ASSISTANT

## 2021-10-22 VITALS — BODY MASS INDEX: 37.86 KG/M2 | WEIGHT: 295 LBS | TEMPERATURE: 98.7 F | HEIGHT: 74 IN

## 2021-10-22 DIAGNOSIS — M54.2 CERVICAL SPINE PAIN: ICD-10-CM

## 2021-10-22 DIAGNOSIS — M75.42 IMPINGEMENT SYNDROME OF LEFT SHOULDER: ICD-10-CM

## 2021-10-22 DIAGNOSIS — M25.512 LEFT SHOULDER PAIN, UNSPECIFIED CHRONICITY: ICD-10-CM

## 2021-10-22 DIAGNOSIS — M54.2 CERVICAL SPINE PAIN: Primary | ICD-10-CM

## 2021-10-22 PROCEDURE — 72040 X-RAY EXAM NECK SPINE 2-3 VW: CPT | Performed by: RADIOLOGY

## 2021-10-22 PROCEDURE — 99213 OFFICE O/P EST LOW 20 MIN: CPT | Performed by: PHYSICIAN ASSISTANT

## 2021-10-22 PROCEDURE — 72040 X-RAY EXAM NECK SPINE 2-3 VW: CPT

## 2021-10-22 RX ORDER — METHYLPREDNISOLONE 4 MG/1
TABLET ORAL
Qty: 21 TABLET | Refills: 0 | Status: SHIPPED | OUTPATIENT
Start: 2021-10-22 | End: 2021-10-22

## 2021-10-22 RX ORDER — METHYLPREDNISOLONE 4 MG/1
TABLET ORAL
Qty: 21 TABLET | Refills: 0 | Status: SHIPPED | OUTPATIENT
Start: 2021-10-22 | End: 2021-12-17 | Stop reason: ALTCHOICE

## 2021-10-22 NOTE — PROGRESS NOTES
[facility] [place] [facility] [place]          Patient: Jose R Hilliard  YOB: 1965  Date of Encounter: 10/22/2021  PCP: Edith Sloan APRN      Subjective     Chief Complaint   Patient presents with   • Left Shoulder - Follow-up, Pain           History of Present Illness:     Jose R Hilliard is a 56 y.o. male presents today secondary to continuation of left shoulder pain several years duration.  Patient has seen noticeable improvement in the past with subacromial injection.  He reports he is beginning to have return of pain symptoms with progression of longstanding cervical spine pain.  He reports popping sensation that cause exacerbation and pain to the basicervical region of the neck and into the left shoulder and radiation down into left hand numbness and tingling.  Patient is requesting cervical spine radiographs for further evaluation of this.  He states that the left shoulder has become more stiff and he is beginning to slowly have return of his left shoulder pain.             There is no problem list on file for this patient.    Past Medical History:   Diagnosis Date   • BPH (benign prostatic hypertrophy) with urinary obstruction    • Disease of thyroid gland    • Goiter    • History of hyperlipidemia    • History of hypertension    • History of osteoarthritis    • Hypogonadism in male    • Polycythemia      Past Surgical History:   Procedure Laterality Date   • CHOLECYSTECTOMY       Social History     Occupational History   • Not on file   Tobacco Use   • Smoking status: Never Smoker   • Smokeless tobacco: Never Used   Vaping Use   • Vaping Use: Never used   Substance and Sexual Activity   • Alcohol use: No   • Drug use: No   • Sexual activity: Not on file    Jose R Hilliard  reports that he has never smoked. He has never used smokeless tobacco.. I have educated him on the risk of diseases from using tobacco products such as cancer, COPD and heart disease.          Social History      Social History Narrative   • Not on file     Family History   Problem Relation Age of Onset   • No Known Problems Father    • No Known Problems Mother    • Stroke Paternal Grandfather    • Diabetes Paternal Grandfather      Current Outpatient Medications   Medication Sig Dispense Refill   • aspirin (KP ASPIRIN) 81 MG EC tablet Take 1 tablet by mouth daily 30 tablet 2   • bisoprolol-hydrochlorothiazide (ZIAC) 5-6.25 MG per tablet Take 1 tablet by mouth daily 30 tablet 2   • calcium carbonate (OS-YUNIEL) 600 MG tablet Take 600 mg by mouth Daily.     • gabapentin (NEURONTIN) 600 MG tablet      • leflunomide (ARAVA) 10 MG tablet      • levothyroxine (SYNTHROID, LEVOTHROID) 88 MCG tablet Take 1 tablet by mouth daily 30 tablet 2   • magnesium oxide (MAGOX) 400 (241.3 Mg) MG tablet tablet Take 400 mg by mouth Daily.     • meloxicam (MOBIC) 7.5 MG tablet      • naproxen (NAPROSYN) 500 MG tablet Take 500 mg by mouth 2 (Two) Times a Day As Needed.     • Testosterone Cypionate (DEPOTESTOTERONE CYPIONATE) 200 MG/ML injection Inject 1 mL intramuscularly every 3 weeks. (Patient taking differently: Inject 200 mg into the appropriate muscle as directed by prescriber. Hasn't been taking it currently) 1 mL 3   • methylPREDNISolone (MEDROL) 4 MG dose pack Use as directed by package instructions 21 tablet 0     No current facility-administered medications for this visit.     Allergies   Allergen Reactions   • Adhesive Tape Rash     Some tapes            Review of Systems   Constitutional: Negative.   HENT: Negative.         Sinus pain   Eyes: Negative.    Cardiovascular: Negative.    Respiratory: Negative.    Endocrine: Negative.    Hematologic/Lymphatic: Negative.    Skin: Negative.    Musculoskeletal: Positive for back pain, joint pain, joint swelling and neck pain.        Pertinent positives listed in HPI   Gastrointestinal: Negative.    Genitourinary: Negative.    Neurological: Negative.    Psychiatric/Behavioral: Negative.   "  Allergic/Immunologic: Negative.          Objective      Vitals:    10/22/21 0951   Temp: 98.7 °F (37.1 °C)   Weight: 134 kg (295 lb)   Height: 188 cm (74\")      Patient's Body mass index is 37.88 kg/m². indicating that he is obese (BMI >30). Obesity-related health conditions include the following: Listed in PMH. Obesity is unchanged. BMI is is above average; BMI management plan is completed. We discussed portion control and increasing exercise..      Physical Exam  Vitals and nursing note reviewed.   Constitutional:       General: He is not in acute distress.     Appearance: Normal appearance. He is not ill-appearing.   HENT:      Head: Normocephalic and atraumatic.      Right Ear: External ear normal.      Left Ear: External ear normal.      Nose: Nose normal.      Mouth/Throat:      Mouth: Mucous membranes are moist.      Pharynx: Oropharynx is clear.   Eyes:      Extraocular Movements: Extraocular movements intact.      Conjunctiva/sclera: Conjunctivae normal.      Pupils: Pupils are equal, round, and reactive to light.   Cardiovascular:      Rate and Rhythm: Normal rate.      Pulses: Normal pulses.   Pulmonary:      Effort: Pulmonary effort is normal.   Abdominal:      General: There is no distension.   Musculoskeletal:      Cervical back: Spasms, tenderness, bony tenderness and crepitus present. No swelling or rigidity. Pain with movement present. Decreased range of motion.      Comments: Examination today the patient's left shoulder reveals that he has improvement with subacromial tenderness to palpation.  Patient has adequate forward elevation with pain at 90 degrees.  Abduction produces mild pain at 90 degrees.  No pain upon internal rotation at side.  Once again the patient continues to exhibit intact strength with Jobes maneuver and speeds test.  Gee and Neer's produce no pain today.  Neurovascular status grossly intact left upper extremity.     Skin:     General: Skin is warm and dry.      Capillary " Refill: Capillary refill takes less than 2 seconds.   Neurological:      General: No focal deficit present.      Mental Status: He is alert and oriented to person, place, and time.      Cranial Nerves: Cranial nerves are intact.   Psychiatric:         Mood and Affect: Mood normal.         Behavior: Behavior normal.             Radiology:      Cervical spine radiographs today reveals multilevel degenerative change with loss of cervical gnosis.  No acute fractures or dislocations noted.  No acute osseous abnormality      Assessment/Plan        ICD-10-CM ICD-9-CM   1. Cervical spine pain  M54.2 723.1   2. Impingement syndrome of left shoulder  M75.42 726.2   3. Left shoulder pain, unspecified chronicity  M25.512 719.41     56-year-old male with notable subacromial pain/bursitis/tendinitis as well as cervical radicular pain longstanding.  Patient has temporarily improved with the previous subacromial ejections and states that the left shoulder and upper extremity is now becoming more tight.  He difficulty with range of motion of his neck with popping sensations at times causing numbness into his left upper extremity.  As result the patient was provided today with a Medrol Dosepak to be taken as directed to reduce overall inflammation as the patient is also having complaints of longstanding left knee and bilateral hand swelling and other rheumatologic exacerbations.  Patient return back in 3 weeks for further evaluation of efficacy of the conservative treatment.                  This document was signed by Jay Roy PA-C October 22, 2021     CC: Edith Sloan APRN EMR Dragon/Transcription disclaimer:  Part of this note may be completed utilizing the dragon speech recognition software. This electronic transcription/translation of spoken language to printed text may contain grammatical errors, random word insertions, pronoun errors, and incomplete sentences or occasional consequences of the system due to  software limitations, ambient noise, and hardware issues.  Any questions or concerns about the content, text, or information contained within the body of this dictation should be directly addressed to the physician for clarification.

## 2021-11-12 ENCOUNTER — OFFICE VISIT (OUTPATIENT)
Dept: ORTHOPEDIC SURGERY | Facility: CLINIC | Age: 56
End: 2021-11-12

## 2021-11-12 VITALS — WEIGHT: 290 LBS | HEIGHT: 74 IN | BODY MASS INDEX: 37.22 KG/M2

## 2021-11-12 DIAGNOSIS — M25.512 LEFT SHOULDER PAIN, UNSPECIFIED CHRONICITY: ICD-10-CM

## 2021-11-12 DIAGNOSIS — M75.42 IMPINGEMENT SYNDROME OF LEFT SHOULDER: Primary | ICD-10-CM

## 2021-11-12 DIAGNOSIS — M54.2 CERVICAL SPINE PAIN: ICD-10-CM

## 2021-11-12 PROCEDURE — 99213 OFFICE O/P EST LOW 20 MIN: CPT | Performed by: PHYSICIAN ASSISTANT

## 2021-11-12 NOTE — PROGRESS NOTES
[facility] – [place], [place] Orthopaedic Surgery Established Patient Visit          Patient: Jose R Hilliard  YOB: 1965  Date of Encounter: 11/12/2021  PCP: Edith Sloan APRN      Subjective     Chief Complaint   Patient presents with   • Left Shoulder - Pain, Popping, Cracking, Follow-up           History of Present Illness:     Jose R Hilliard is a 56 y.o. male presents today secondary to ongoing left shoulder pain as well as cervicalgia.  The patient has seen improvement with the previous Medrol Dosepak alleviating his pain into the left shoulder and neck with recent return of the symptoms.  He states the shoulder is not as bad however he continued to have the popping and cracking and decreased range of motion of the neck.  He will have intermittent stiffness in the left shoulder with appearance of activity.  He reports no other new symptoms.  Denies any current paresthesias with exception of the bilateral hands which is longstanding and consistent with carpal tunnel syndrome.             There is no problem list on file for this patient.    Past Medical History:   Diagnosis Date   • BPH (benign prostatic hypertrophy) with urinary obstruction    • Disease of thyroid gland    • Goiter    • History of hyperlipidemia    • History of hypertension    • History of osteoarthritis    • Hypogonadism in male    • Polycythemia      Past Surgical History:   Procedure Laterality Date   • CHOLECYSTECTOMY       Social History     Occupational History   • Not on file   Tobacco Use   • Smoking status: Never Smoker   • Smokeless tobacco: Never Used   Vaping Use   • Vaping Use: Never used   Substance and Sexual Activity   • Alcohol use: No   • Drug use: No   • Sexual activity: Not on file    Jose R Hilliard  reports that he has never smoked. He has never used smokeless tobacco.. I have educated him on the risk of diseases from using tobacco products such as cancer, COPD and heart disease.          Social History     Social History Narrative   •  Not on file     Family History   Problem Relation Age of Onset   • No Known Problems Father    • No Known Problems Mother    • Stroke Paternal Grandfather    • Diabetes Paternal Grandfather      Current Outpatient Medications   Medication Sig Dispense Refill   • aspirin (KP ASPIRIN) 81 MG EC tablet Take 1 tablet by mouth daily 30 tablet 2   • bisoprolol-hydrochlorothiazide (ZIAC) 5-6.25 MG per tablet Take 1 tablet by mouth daily 30 tablet 2   • calcium carbonate (OS-YUNIEL) 600 MG tablet Take 600 mg by mouth Daily.     • gabapentin (NEURONTIN) 600 MG tablet      • leflunomide (ARAVA) 10 MG tablet      • levothyroxine (SYNTHROID, LEVOTHROID) 88 MCG tablet Take 1 tablet by mouth daily 30 tablet 2   • magnesium oxide (MAGOX) 400 (241.3 Mg) MG tablet tablet Take 400 mg by mouth Daily.     • meloxicam (MOBIC) 7.5 MG tablet      • methylPREDNISolone (MEDROL) 4 MG dose pack Use as directed by package instructions 21 tablet 0   • naproxen (NAPROSYN) 500 MG tablet Take 500 mg by mouth 2 (Two) Times a Day As Needed.     • Testosterone Cypionate (DEPOTESTOTERONE CYPIONATE) 200 MG/ML injection Inject 1 mL intramuscularly every 3 weeks. (Patient taking differently: Inject 200 mg into the appropriate muscle as directed by prescriber. Hasn't been taking it currently) 1 mL 3     No current facility-administered medications for this visit.     Allergies   Allergen Reactions   • Adhesive Tape Rash     Some tapes            Review of Systems   Constitutional: Negative.   HENT: Negative.         Sinus pain   Eyes: Negative.    Cardiovascular: Negative.    Respiratory: Negative.    Endocrine: Negative.    Hematologic/Lymphatic: Negative.    Skin: Negative.    Musculoskeletal: Positive for back pain, joint pain, joint swelling and neck pain.        Pertinent positives listed in HPI   Gastrointestinal: Negative.    Genitourinary: Negative.    Neurological: Negative.    Psychiatric/Behavioral: Negative.    Allergic/Immunologic: Negative.   "        Objective      Vitals:    11/12/21 0843   Weight: 132 kg (290 lb)   Height: 188 cm (74\")      Patient's Body mass index is 37.23 kg/m². indicating that he is obese (BMI >30). Obesity-related health conditions include the following: Listed in PMH. Obesity is unchanged. BMI is is above average; BMI management plan is completed. We discussed portion control and increasing exercise..      Physical Exam  Vitals and nursing note reviewed.   Constitutional:       General: He is not in acute distress.     Appearance: Normal appearance. He is not ill-appearing.   HENT:      Head: Normocephalic and atraumatic.      Right Ear: External ear normal.      Left Ear: External ear normal.      Nose: Nose normal.      Mouth/Throat:      Mouth: Mucous membranes are moist.      Pharynx: Oropharynx is clear.   Eyes:      Extraocular Movements: Extraocular movements intact.      Conjunctiva/sclera: Conjunctivae normal.      Pupils: Pupils are equal, round, and reactive to light.   Cardiovascular:      Rate and Rhythm: Normal rate.      Pulses: Normal pulses.   Pulmonary:      Effort: Pulmonary effort is normal.   Abdominal:      General: There is no distension.   Musculoskeletal:      Cervical back: Spasms, tenderness, bony tenderness and crepitus present. No swelling or rigidity. Pain with movement present. Decreased range of motion.      Comments: Examination today the patient's left shoulder reveals that he has improvement with subacromial tenderness to palpation.  Patient has adequate forward elevation with pain at 90 degrees.  Abduction produces mild pain at 90 degrees.  Mild pain upon internal rotation at side.  Once again the patient continues to exhibit intact strength with Jobes maneuver and speeds test.  Gee and Neer's produce minimal pain today.  Neurovascular status grossly intact left upper extremity.  Cervical spine examination today reveals painful lateral rotation and forward flexion.  Patient has facet " loading positive left-sided with positive triceps reflexes intact.  Longstanding bilateral hand and wrist numbness with Phalen's and Tinel's sign.     Skin:     General: Skin is warm and dry.      Capillary Refill: Capillary refill takes less than 2 seconds.   Neurological:      General: No focal deficit present.      Mental Status: He is alert and oriented to person, place, and time.      Cranial Nerves: Cranial nerves are intact.   Psychiatric:         Mood and Affect: Mood normal.         Behavior: Behavior normal.             Radiology:      Cervical spine radiographs today reveals multilevel degenerative change with loss of cervical lordosis.  No acute fractures or dislocations noted. No acute osseous abnormality      Assessment/Plan        ICD-10-CM ICD-9-CM   1. Impingement syndrome of left shoulder  M75.42 726.2   2. Cervical spine pain  M54.2 723.1   3. Left shoulder pain, unspecified chronicity  M25.512 719.41     56-year-old male with notable subacromial bursitis/tendinitis with shoulder impingement and cervicalgia.  The patient has done well with Medrol Dosepak however this is began to return and he has continued stiffness of the shoulder and neck.  As result of this the patient was provided work full outpatient physical therapy with modalities aimed at reducing pain and swelling and increasing overall range of motion and stability.  Patient return back in 4 weeks following the formal outpatient therapy.  We discussed the possibility of further diagnostic imaging however we will allow for conservative treatment going forward.  Patient continue modified activity allow pain swelling be his guide with his occupation.              This document was signed by Jay Roy PA-C November 12, 2021     CC: Edith Sloan APRN EMR Dragon/Transcription disclaimer:  Part of this note may be completed utilizing the dragon speech recognition software. This electronic transcription/translation of spoken  language to printed text may contain grammatical errors, random word insertions, pronoun errors, and incomplete sentences or occasional consequences of the system due to software limitations, ambient noise, and hardware issues.  Any questions or concerns about the content, text, or information contained within the body of this dictation should be directly addressed to the physician for clarification.

## 2021-12-17 ENCOUNTER — OFFICE VISIT (OUTPATIENT)
Dept: ORTHOPEDIC SURGERY | Facility: CLINIC | Age: 56
End: 2021-12-17

## 2021-12-17 VITALS — BODY MASS INDEX: 37.22 KG/M2 | WEIGHT: 290 LBS | HEIGHT: 74 IN

## 2021-12-17 DIAGNOSIS — M25.512 LEFT SHOULDER PAIN, UNSPECIFIED CHRONICITY: ICD-10-CM

## 2021-12-17 DIAGNOSIS — M54.2 CERVICAL SPINE PAIN: ICD-10-CM

## 2021-12-17 DIAGNOSIS — M75.42 IMPINGEMENT SYNDROME OF LEFT SHOULDER: Primary | ICD-10-CM

## 2021-12-17 PROCEDURE — 99213 OFFICE O/P EST LOW 20 MIN: CPT | Performed by: PHYSICIAN ASSISTANT

## 2021-12-17 NOTE — PROGRESS NOTES
[facility] [place] Visit          Patient: Jose R Hilliard  YOB: 1965  Date of Encounter: 12/17/2021  PCP: Edith Sloan APRN      Subjective     Chief Complaint   Patient presents with   • Left Shoulder - Follow-up           History of Present Illness:     Jose R Hilliard is a 56 y.o. male presents today secondary to ongoing left shoulder pain as well as cervicalgia.  The patient has seen improvement with the previous Medrol Dosepak alleviating his pain into the left shoulder and neck with recent return of the symptoms.  Patient states that since implementing the form outpatient physical therapy as well as the home exercises he has been ambulating with therapy and work he has had decrease in overall pain in symptoms and increasing range of motion.  He has had to discontinue the outpatient physical therapy work as result of his work travel however he has been continuing to do the home exercises while traveling.  He reports overall general increase in range of motion and decrease in activity with no significant complications or exacerbations.  Patient denies any new complaints.  Denies paresthesias.         There is no problem list on file for this patient.    Past Medical History:   Diagnosis Date   • BPH (benign prostatic hypertrophy) with urinary obstruction    • Disease of thyroid gland    • Goiter    • History of hyperlipidemia    • History of hypertension    • History of osteoarthritis    • Hypogonadism in male    • Polycythemia      Past Surgical History:   Procedure Laterality Date   • CHOLECYSTECTOMY       Social History     Occupational History   • Not on file   Tobacco Use   • Smoking status: Never Smoker   • Smokeless tobacco: Never Used   Vaping Use   • Vaping Use: Never used   Substance and Sexual Activity   • Alcohol use: No   • Drug use: No   • Sexual activity: Defer    Jose R Hilliard  reports that he has never smoked. He has never used smokeless tobacco.. I have  educated him on the risk of diseases from using tobacco products such as cancer, COPD and heart disease.          Social History     Social History Narrative   • Not on file     Family History   Problem Relation Age of Onset   • No Known Problems Father    • No Known Problems Mother    • Stroke Paternal Grandfather    • Diabetes Paternal Grandfather      Current Outpatient Medications   Medication Sig Dispense Refill   • aspirin (KP ASPIRIN) 81 MG EC tablet Take 1 tablet by mouth daily 30 tablet 2   • bisoprolol-hydrochlorothiazide (ZIAC) 5-6.25 MG per tablet Take 1 tablet by mouth daily 30 tablet 2   • calcium carbonate (OS-YUNIEL) 600 MG tablet Take 600 mg by mouth Daily.     • gabapentin (NEURONTIN) 600 MG tablet      • leflunomide (ARAVA) 10 MG tablet      • levothyroxine (SYNTHROID, LEVOTHROID) 88 MCG tablet Take 1 tablet by mouth daily 30 tablet 2   • magnesium oxide (MAGOX) 400 (241.3 Mg) MG tablet tablet Take 400 mg by mouth Daily.     • meloxicam (MOBIC) 7.5 MG tablet      • naproxen (NAPROSYN) 500 MG tablet Take 500 mg by mouth 2 (Two) Times a Day As Needed.     • Testosterone Cypionate (DEPOTESTOTERONE CYPIONATE) 200 MG/ML injection Inject 1 mL intramuscularly every 3 weeks. (Patient taking differently: Inject 200 mg into the appropriate muscle as directed by prescriber. Hasn't been taking it currently) 1 mL 3     No current facility-administered medications for this visit.     Allergies   Allergen Reactions   • Adhesive Tape Rash     Some tapes            Review of Systems   Constitutional: Negative.   HENT: Negative.         Sinus pain   Eyes: Negative.    Cardiovascular: Negative.    Respiratory: Negative.    Endocrine: Negative.    Hematologic/Lymphatic: Negative.    Skin: Negative.    Musculoskeletal: Positive for back pain, joint pain, joint swelling and neck pain.        Pertinent positives listed in HPI   Gastrointestinal: Negative.    Genitourinary: Negative.    Neurological: Negative.   "  Psychiatric/Behavioral: Negative.    Allergic/Immunologic: Negative.          Objective      Vitals:    12/17/21 0847   Weight: 132 kg (290 lb)   Height: 188 cm (74\")      Patient's Body mass index is 37.23 kg/m². indicating that he is obese (BMI >30). Obesity-related health conditions include the following: Listed in PMH. Obesity is unchanged. BMI is is above average; BMI management plan is completed. We discussed portion control and increasing exercise..      Physical Exam  Vitals and nursing note reviewed.   Constitutional:       General: He is not in acute distress.     Appearance: Normal appearance. He is not ill-appearing.   HENT:      Head: Normocephalic and atraumatic.      Right Ear: External ear normal.      Left Ear: External ear normal.      Nose: Nose normal.      Mouth/Throat:      Mouth: Mucous membranes are moist.      Pharynx: Oropharynx is clear.   Eyes:      Extraocular Movements: Extraocular movements intact.      Conjunctiva/sclera: Conjunctivae normal.      Pupils: Pupils are equal, round, and reactive to light.   Cardiovascular:      Rate and Rhythm: Normal rate.      Pulses: Normal pulses.   Pulmonary:      Effort: Pulmonary effort is normal.   Abdominal:      General: There is no distension.   Musculoskeletal:      Cervical back: Spasms, tenderness, bony tenderness and crepitus present. No swelling or rigidity. Pain with movement present. Decreased range of motion.      Comments: Examination today the patient's left shoulder reveals that he has continued improvement with minimal subacromial tenderness to palpation.  Patient has adequate forward elevation fully with no pain.  Abduction produces mild pain at 1200 degrees.  No pain upon internal rotation at side. intact strength with Jobes maneuver and speeds test.  Gee and Neer's produce minimal pain today.  Neurovascular status grossly intact left upper extremity.  Cervical spine examination today reveals painful lateral rotation and " forward flexion.  Patient has facet loading negative left-sided with positive triceps reflexes intact.  Longstanding bilateral hand and wrist numbness with Phalen's and Tinel's sign.     Skin:     General: Skin is warm and dry.      Capillary Refill: Capillary refill takes less than 2 seconds.   Neurological:      General: No focal deficit present.      Mental Status: He is alert and oriented to person, place, and time.      Cranial Nerves: Cranial nerves are intact.   Psychiatric:         Mood and Affect: Mood normal.         Behavior: Behavior normal.             Radiology:      Cervical spine radiographs today reveals multilevel degenerative change with loss of cervical lordosis.  No acute fractures or dislocations noted. No acute osseous abnormality      Assessment/Plan        ICD-10-CM ICD-9-CM   1. Impingement syndrome of left shoulder  M75.42 726.2   2. Cervical spine pain  M54.2 723.1   3. Left shoulder pain, unspecified chronicity  M25.512 719.41       56-year-old male with notable left shoulder impingement with cervicalgia and left-sided radicular component symptoms.  Since finishing Medrol Dosepak as well as continuation with the physical therapy exercises patient has had reduction of pain in symptoms.  He still has some numbness and tingling into the hand however this is less prevalent.  Secondary to the patient's improvement he will continue with his daily home exercises and will return back in a week for further evaluation.  If continuation of numbness/tingling or worsening/return of pain or symptoms into the left upper extremity we discussed possibility of further diagnostic imaging versus repeat steroid injection.  Patient is to continue modified activity allow pain/swelling be his guide with his occupation.              This document was signed by Jay Roy PA-C December 17, 2021     CC: Edith Sloan APRN EMR Dragon/Transcription disclaimer:  Part of this note may be completed  utilizing the dragon speech recognition software. This electronic transcription/translation of spoken language to printed text may contain grammatical errors, random word insertions, pronoun errors, and incomplete sentences or occasional consequences of the system due to software limitations, ambient noise, and hardware issues.  Any questions or concerns about the content, text, or information contained within the body of this dictation should be directly addressed to the physician for clarification.

## 2022-02-11 ENCOUNTER — OFFICE VISIT (OUTPATIENT)
Dept: ORTHOPEDIC SURGERY | Facility: CLINIC | Age: 57
End: 2022-02-11

## 2022-02-11 VITALS — BODY MASS INDEX: 37.22 KG/M2 | HEIGHT: 74 IN | WEIGHT: 290 LBS

## 2022-02-11 DIAGNOSIS — M54.2 CERVICAL SPINE PAIN: ICD-10-CM

## 2022-02-11 DIAGNOSIS — M25.512 LEFT SHOULDER PAIN, UNSPECIFIED CHRONICITY: ICD-10-CM

## 2022-02-11 DIAGNOSIS — M75.42 IMPINGEMENT SYNDROME OF LEFT SHOULDER: Primary | ICD-10-CM

## 2022-02-11 PROCEDURE — 20610 DRAIN/INJ JOINT/BURSA W/O US: CPT | Performed by: PHYSICIAN ASSISTANT

## 2022-02-11 PROCEDURE — 99213 OFFICE O/P EST LOW 20 MIN: CPT | Performed by: PHYSICIAN ASSISTANT

## 2022-02-11 RX ADMIN — METHYLPREDNISOLONE ACETATE 80 MG: 80 INJECTION, SUSPENSION INTRA-ARTICULAR; INTRALESIONAL; INTRAMUSCULAR; SOFT TISSUE at 11:58

## 2022-02-11 RX ADMIN — LIDOCAINE HYDROCHLORIDE 5 ML: 10 INJECTION, SOLUTION EPIDURAL; INFILTRATION; INTRACAUDAL; PERINEURAL at 11:58

## 2022-02-15 RX ORDER — LIDOCAINE HYDROCHLORIDE 10 MG/ML
5 INJECTION, SOLUTION EPIDURAL; INFILTRATION; INTRACAUDAL; PERINEURAL
Status: COMPLETED | OUTPATIENT
Start: 2022-02-11 | End: 2022-02-11

## 2022-02-15 RX ORDER — METHYLPREDNISOLONE ACETATE 80 MG/ML
80 INJECTION, SUSPENSION INTRA-ARTICULAR; INTRALESIONAL; INTRAMUSCULAR; SOFT TISSUE
Status: COMPLETED | OUTPATIENT
Start: 2022-02-11 | End: 2022-02-11

## 2022-02-15 NOTE — PROGRESS NOTES
[facility] [place] Orthopaedic Surgery Established Patient Visit          Patient: Jose R Hilliard  YOB: 1965  Date of Encounter: 2/11/2022  PCP: Edith Sloan APRN      Subjective     Chief Complaint   Patient presents with   • Left Shoulder - Follow-up, Pain, Numbness           History of Present Illness:     Jose R Hilliard is a 56 y.o. male presents today secondary to ongoing left shoulder pain as well as cervicalgia.  The patient has seen improvement with the previous Medrol Dosepak temporarily alleviating his pain into the left shoulder and neck with recent return of the symptoms.  Patient has been implementing formal outpatient physical therapy as well as the home exercises.  Patient reports continued left upper extremity numbness and tingling with pain along the basicervical region of the neck into the parascapular region and into the left hand.  Patient is difficult upon range of motion of the cervical spine to the right.  Prolonged sitting and activity tends to exacerbate this.  He also has trouble sleeping as result.  Left shoulder pain has returned with lateral shoulder pain radiating down to the mid upper arm worse upon positional overhead activities.  Denies any other new complaints.        There is no problem list on file for this patient.    Past Medical History:   Diagnosis Date   • BPH (benign prostatic hypertrophy) with urinary obstruction    • Disease of thyroid gland    • Goiter    • History of hyperlipidemia    • History of hypertension    • History of osteoarthritis    • Hypogonadism in male    • Polycythemia      Past Surgical History:   Procedure Laterality Date   • CHOLECYSTECTOMY       Social History     Occupational History   • Not on file   Tobacco Use   • Smoking status: Never Smoker   • Smokeless tobacco: Never Used   Vaping Use   • Vaping Use: Never used   Substance and Sexual Activity   • Alcohol use: No   • Drug use: No   • Sexual activity: Defer    Jose R Hililard  reports that he has  never smoked. He has never used smokeless tobacco.. I have educated him on the risk of diseases from using tobacco products such as cancer, COPD and heart disease.          Social History     Social History Narrative   • Not on file     Family History   Problem Relation Age of Onset   • No Known Problems Father    • No Known Problems Mother    • Stroke Paternal Grandfather    • Diabetes Paternal Grandfather      Current Outpatient Medications   Medication Sig Dispense Refill   • aspirin (KP ASPIRIN) 81 MG EC tablet Take 1 tablet by mouth daily 30 tablet 2   • bisoprolol-hydrochlorothiazide (ZIAC) 5-6.25 MG per tablet Take 1 tablet by mouth daily 30 tablet 2   • calcium carbonate (OS-YUNIEL) 600 MG tablet Take 600 mg by mouth Daily.     • gabapentin (NEURONTIN) 600 MG tablet      • leflunomide (ARAVA) 10 MG tablet      • levothyroxine (SYNTHROID, LEVOTHROID) 88 MCG tablet Take 1 tablet by mouth daily 30 tablet 2   • magnesium oxide (MAGOX) 400 (241.3 Mg) MG tablet tablet Take 400 mg by mouth Daily.     • meloxicam (MOBIC) 7.5 MG tablet      • naproxen (NAPROSYN) 500 MG tablet Take 500 mg by mouth 2 (Two) Times a Day As Needed.     • Testosterone Cypionate (DEPOTESTOTERONE CYPIONATE) 200 MG/ML injection Inject 1 mL intramuscularly every 3 weeks. (Patient taking differently: Inject 200 mg into the appropriate muscle as directed by prescriber. Hasn't been taking it currently) 1 mL 3     No current facility-administered medications for this visit.     Allergies   Allergen Reactions   • Adhesive Tape Rash     Some tapes            Review of Systems   Constitutional: Negative.   HENT: Negative.         Sinus pain   Eyes: Negative.    Cardiovascular: Negative.    Respiratory: Negative.    Endocrine: Negative.    Hematologic/Lymphatic: Negative.    Skin: Negative.    Musculoskeletal: Positive for back pain, joint pain, joint swelling and neck pain.        Pertinent positives listed in HPI   Gastrointestinal: Negative.   "  Genitourinary: Negative.    Neurological: Negative.    Psychiatric/Behavioral: Negative.    Allergic/Immunologic: Negative.          Objective      Vitals:    02/11/22 1006   Weight: 132 kg (290 lb)   Height: 188 cm (74\")      Patient's Body mass index is 37.23 kg/m². indicating that he is obese (BMI >30). Obesity-related health conditions include the following: Listed in PMH. Obesity is unchanged. BMI is is above average; BMI management plan is completed. We discussed portion control and increasing exercise..      Physical Exam  Vitals and nursing note reviewed.   Constitutional:       General: He is not in acute distress.     Appearance: Normal appearance. He is not ill-appearing.   HENT:      Head: Normocephalic and atraumatic.      Right Ear: External ear normal.      Left Ear: External ear normal.      Nose: Nose normal.      Mouth/Throat:      Mouth: Mucous membranes are moist.      Pharynx: Oropharynx is clear.   Eyes:      Extraocular Movements: Extraocular movements intact.      Conjunctiva/sclera: Conjunctivae normal.      Pupils: Pupils are equal, round, and reactive to light.   Cardiovascular:      Rate and Rhythm: Normal rate.      Pulses: Normal pulses.   Pulmonary:      Effort: Pulmonary effort is normal.   Abdominal:      General: There is no distension.   Musculoskeletal:      Cervical back: Spasms, tenderness, bony tenderness and crepitus present. No swelling or rigidity. Pain with movement present. Decreased range of motion.      Comments: Examination today the patient's left shoulder reveals that he has return of the previous subacromial tenderness to palpation.  Patient has adequate forward elevation fully with minimal pain.  Abduction produces mild pain at 120 degrees.  No pain upon internal rotation at side. intact strength with Jobes maneuver and speeds test.  Gee and Neer's produce minimal pain today.  Neurovascular status grossly intact left upper extremity.  Cervical spine " examination today reveals painful lateral rotation and forward flexion.  Patient has facet loading negative left-sided with positive triceps reflexes intact.  Longstanding bilateral hand and wrist numbness with Phalen's and Tinel's sign.     Skin:     General: Skin is warm and dry.      Capillary Refill: Capillary refill takes less than 2 seconds.   Neurological:      General: No focal deficit present.      Mental Status: He is alert and oriented to person, place, and time.      Cranial Nerves: Cranial nerves are intact.   Psychiatric:         Mood and Affect: Mood normal.         Behavior: Behavior normal.               Radiology:      Cervical spine radiographs once again reviewed revealing multilevel degenerative change with loss of cervical lordosis.  No acute fractures or dislocations noted. No acute osseous abnormality      Assessment/Plan        ICD-10-CM ICD-9-CM   1. Impingement syndrome of left shoulder  M75.42 726.2   2. Cervical spine pain  M54.2 723.1   3. Left shoulder pain, unspecified chronicity  M25.512 719.41         56-year-old male with notable left shoulder impingement as well as continued cervicalgia and left-sided jugular component symptoms.  The patient has undergone physical therapy as well as previous oral steroidal medication with some temporary alleviation.  He still has impingement symptoms of the left shoulder and as result the patient was provided today with a subacromial debridement injection with lidocaine block into the subacromial space of the left shoulder.  Patient tolerated this procedure well.  As result of the patient's continued ongoing pain and symptoms consistent with left cervical myelopathy patient will undergo further imaging diagnostic MRI cervical spine.  He will return back upon completion of the imaging.    Large Joint Arthrocentesis: L subacromial bursa  Date/Time: 2/11/2022 11:58 AM  Consent given by: patient  Site marked: site marked  Supporting  Documentation  Indications: pain and diagnostic evaluation   Procedure Details  Location: shoulder - L subacromial bursa  Needle size: 25 G  Approach: lateral  Medications administered: 80 mg methylPREDNISolone acetate 80 MG/ML; 5 mL lidocaine PF 1% 1 %  Patient tolerance: patient tolerated the procedure well with no immediate complications            This document was signed by Jay Roy PA-C February 11, 2022     CC: Edith Sloan APRN EMR Dragon/Transcription disclaimer:  Part of this note may be completed utilizing the dragon speech recognition software. This electronic transcription/translation of spoken language to printed text may contain grammatical errors, random word insertions, pronoun errors, and incomplete sentences or occasional consequences of the system due to software limitations, ambient noise, and hardware issues.  Any questions or concerns about the content, text, or information contained within the body of this dictation should be directly addressed to the physician for clarification.

## 2022-03-04 ENCOUNTER — HOSPITAL ENCOUNTER (OUTPATIENT)
Dept: MRI IMAGING | Facility: HOSPITAL | Age: 57
Discharge: HOME | End: 2022-03-04
Admitting: PHYSICIAN ASSISTANT

## 2022-03-04 DIAGNOSIS — M54.2 CERVICAL SPINE PAIN: ICD-10-CM

## 2022-03-04 PROCEDURE — 72141 MRI NECK SPINE W/O DYE: CPT

## 2022-03-04 PROCEDURE — 72141 MRI NECK SPINE W/O DYE: CPT | Performed by: RADIOLOGY

## 2022-03-07 ENCOUNTER — TELEPHONE (OUTPATIENT)
Dept: ORTHOPEDIC SURGERY | Facility: CLINIC | Age: 57
End: 2022-03-07

## 2022-03-07 NOTE — TELEPHONE ENCOUNTER
Caller: SINGH BROWNING    Relationship: SELF    Best call back number:     Caller requesting test results: SINGH BROWNING    What test was performed: MRI     When was the test performed: 02/15/22    Where was the test performed: [facility]

## 2022-03-11 ENCOUNTER — TRANSCRIBE ORDERS (OUTPATIENT)
Dept: LAB | Facility: HOSPITAL | Age: 57
End: 2022-03-11

## 2022-03-11 ENCOUNTER — OFFICE VISIT (OUTPATIENT)
Dept: ORTHOPEDIC SURGERY | Facility: CLINIC | Age: 57
End: 2022-03-11

## 2022-03-11 ENCOUNTER — LAB (OUTPATIENT)
Dept: LAB | Facility: HOSPITAL | Age: 57
End: 2022-03-11

## 2022-03-11 VITALS — WEIGHT: 290 LBS | HEIGHT: 74 IN | BODY MASS INDEX: 37.22 KG/M2

## 2022-03-11 DIAGNOSIS — M05.79 SEROPOSITIVE RHEUMATOID ARTHRITIS OF MULTIPLE SITES: ICD-10-CM

## 2022-03-11 DIAGNOSIS — M25.512 LEFT SHOULDER PAIN, UNSPECIFIED CHRONICITY: ICD-10-CM

## 2022-03-11 DIAGNOSIS — M05.79 SEROPOSITIVE RHEUMATOID ARTHRITIS OF MULTIPLE SITES: Primary | ICD-10-CM

## 2022-03-11 DIAGNOSIS — Z79.899 ENCOUNTER FOR LONG-TERM (CURRENT) USE OF OTHER MEDICATIONS: ICD-10-CM

## 2022-03-11 DIAGNOSIS — M54.2 CERVICAL SPINE PAIN: ICD-10-CM

## 2022-03-11 DIAGNOSIS — M75.42 IMPINGEMENT SYNDROME OF LEFT SHOULDER: Primary | ICD-10-CM

## 2022-03-11 LAB
BASOPHILS # BLD AUTO: 0.06 10*3/MM3 (ref 0–0.2)
BASOPHILS NFR BLD AUTO: 0.6 % (ref 0–1.5)
DEPRECATED RDW RBC AUTO: 43.1 FL (ref 37–54)
EOSINOPHIL # BLD AUTO: 0.32 10*3/MM3 (ref 0–0.4)
EOSINOPHIL NFR BLD AUTO: 3.2 % (ref 0.3–6.2)
ERYTHROCYTE [DISTWIDTH] IN BLOOD BY AUTOMATED COUNT: 12.8 % (ref 12.3–15.4)
ERYTHROCYTE [SEDIMENTATION RATE] IN BLOOD: 23 MM/HR (ref 0–20)
HCT VFR BLD AUTO: 44.7 % (ref 37.5–51)
HGB BLD-MCNC: 14.7 G/DL (ref 13–17.7)
IMM GRANULOCYTES # BLD AUTO: 0.06 10*3/MM3 (ref 0–0.05)
IMM GRANULOCYTES NFR BLD AUTO: 0.6 % (ref 0–0.5)
LYMPHOCYTES # BLD AUTO: 2.22 10*3/MM3 (ref 0.7–3.1)
LYMPHOCYTES NFR BLD AUTO: 22.3 % (ref 19.6–45.3)
MCH RBC QN AUTO: 30.4 PG (ref 26.6–33)
MCHC RBC AUTO-ENTMCNC: 32.9 G/DL (ref 31.5–35.7)
MCV RBC AUTO: 92.4 FL (ref 79–97)
MONOCYTES # BLD AUTO: 1.26 10*3/MM3 (ref 0.1–0.9)
MONOCYTES NFR BLD AUTO: 12.7 % (ref 5–12)
NEUTROPHILS NFR BLD AUTO: 6.02 10*3/MM3 (ref 1.7–7)
NEUTROPHILS NFR BLD AUTO: 60.6 % (ref 42.7–76)
NRBC BLD AUTO-RTO: 0 /100 WBC (ref 0–0.2)
PLATELET # BLD AUTO: 299 10*3/MM3 (ref 140–450)
PMV BLD AUTO: 10.4 FL (ref 6–12)
RBC # BLD AUTO: 4.84 10*6/MM3 (ref 4.14–5.8)
WBC NRBC COR # BLD: 9.94 10*3/MM3 (ref 3.4–10.8)

## 2022-03-11 PROCEDURE — 36415 COLL VENOUS BLD VENIPUNCTURE: CPT

## 2022-03-11 PROCEDURE — 99213 OFFICE O/P EST LOW 20 MIN: CPT | Performed by: PHYSICIAN ASSISTANT

## 2022-03-11 PROCEDURE — 80053 COMPREHEN METABOLIC PANEL: CPT

## 2022-03-11 PROCEDURE — 85652 RBC SED RATE AUTOMATED: CPT

## 2022-03-11 PROCEDURE — 86140 C-REACTIVE PROTEIN: CPT

## 2022-03-11 PROCEDURE — 85025 COMPLETE CBC W/AUTO DIFF WBC: CPT

## 2022-03-11 RX ORDER — MELOXICAM 7.5 MG/1
15 TABLET ORAL DAILY
Qty: 30 TABLET | Refills: 0 | Status: SHIPPED | OUTPATIENT
Start: 2022-03-11 | End: 2022-03-11

## 2022-03-11 RX ORDER — MELOXICAM 7.5 MG/1
15 TABLET ORAL DAILY
Qty: 60 TABLET | Refills: 2 | Status: SHIPPED | OUTPATIENT
Start: 2022-03-11

## 2022-03-12 LAB
ALBUMIN SERPL-MCNC: 3.9 G/DL (ref 3.5–5.2)
ALBUMIN/GLOB SERPL: 1.3 G/DL
ALP SERPL-CCNC: 63 U/L (ref 39–117)
ALT SERPL W P-5'-P-CCNC: 32 U/L (ref 1–41)
ANION GAP SERPL CALCULATED.3IONS-SCNC: 9.7 MMOL/L (ref 5–15)
AST SERPL-CCNC: 25 U/L (ref 1–40)
BILIRUB SERPL-MCNC: 0.3 MG/DL (ref 0–1.2)
BUN SERPL-MCNC: 15 MG/DL (ref 6–20)
BUN/CREAT SERPL: 12.1 (ref 7–25)
CALCIUM SPEC-SCNC: 8.9 MG/DL (ref 8.6–10.5)
CHLORIDE SERPL-SCNC: 107 MMOL/L (ref 98–107)
CO2 SERPL-SCNC: 25.3 MMOL/L (ref 22–29)
CREAT SERPL-MCNC: 1.24 MG/DL (ref 0.76–1.27)
CRP SERPL-MCNC: 0.46 MG/DL (ref 0–0.5)
EGFRCR SERPLBLD CKD-EPI 2021: 68.2 ML/MIN/1.73
GLOBULIN UR ELPH-MCNC: 2.9 GM/DL
GLUCOSE SERPL-MCNC: 65 MG/DL (ref 65–99)
POTASSIUM SERPL-SCNC: 4.3 MMOL/L (ref 3.5–5.2)
PROT SERPL-MCNC: 6.8 G/DL (ref 6–8.5)
SODIUM SERPL-SCNC: 142 MMOL/L (ref 136–145)

## 2022-03-18 NOTE — PROGRESS NOTES
[facility] [place] Orthopaedic Surgery Established Patient Visit          Patient: Jose R Hilliard  YOB: 1965  Date of Encounter: 3/11/2022  PCP: Edith Sloan APRN      Subjective     Chief Complaint   Patient presents with   • Cervical Spine - Follow-up, Pain           History of Present Illness:     Jose R Hilliard is a 56 y.o. male presents today secondary to ongoing left shoulder pain as well as cervicalgia.  The patient has seen temporary improvement with the previous Medrol Dosepak temporarily alleviating his pain into the left shoulder and neck with return of the symptoms.  Most recent subacromial injection to left shoulder was of no benefit.  He continues to have left upper extremity pain with range of motion and abduction of the left shoulder.  Patient reports continued left upper extremity numbness and tingling with cervicalgia. Patient has difficulty upon range of motion of the cervical spine to the right.  Prolonged sitting and activity tends to exacerbate this.  He also has trouble sleeping as result.  Left shoulder pain has returned with lateral shoulder pain radiating down to the mid upper arm worse upon positional overhead activities.  Denies any other new complaints.        There is no problem list on file for this patient.    Past Medical History:   Diagnosis Date   • BPH (benign prostatic hypertrophy) with urinary obstruction    • Disease of thyroid gland    • Goiter    • History of hyperlipidemia    • History of hypertension    • History of osteoarthritis    • Hypogonadism in male    • Polycythemia      Past Surgical History:   Procedure Laterality Date   • CHOLECYSTECTOMY       Social History     Occupational History   • Not on file   Tobacco Use   • Smoking status: Never Smoker   • Smokeless tobacco: Never Used   Vaping Use   • Vaping Use: Never used   Substance and Sexual Activity   • Alcohol use: No   • Drug use: No   • Sexual activity: Defer    Jose R Hilliard  reports that he has never smoked.  He has never used smokeless tobacco.. I have educated him on the risk of diseases from using tobacco products such as cancer, COPD and heart disease.          Social History     Social History Narrative   • Not on file     Family History   Problem Relation Age of Onset   • No Known Problems Father    • No Known Problems Mother    • Stroke Paternal Grandfather    • Diabetes Paternal Grandfather      Current Outpatient Medications   Medication Sig Dispense Refill   • aspirin (KP ASPIRIN) 81 MG EC tablet Take 1 tablet by mouth daily 30 tablet 2   • bisoprolol-hydrochlorothiazide (ZIAC) 5-6.25 MG per tablet Take 1 tablet by mouth daily 30 tablet 2   • calcium carbonate (OS-YUNIEL) 600 MG tablet Take 600 mg by mouth Daily.     • gabapentin (NEURONTIN) 600 MG tablet      • leflunomide (ARAVA) 10 MG tablet      • levothyroxine (SYNTHROID, LEVOTHROID) 88 MCG tablet Take 1 tablet by mouth daily 30 tablet 2   • magnesium oxide (MAGOX) 400 (241.3 Mg) MG tablet tablet Take 400 mg by mouth Daily.     • meloxicam (MOBIC) 7.5 MG tablet Take 2 tablets by mouth Daily. 60 tablet 2   • naproxen (NAPROSYN) 500 MG tablet Take 500 mg by mouth 2 (Two) Times a Day As Needed.     • Testosterone Cypionate (DEPOTESTOTERONE CYPIONATE) 200 MG/ML injection Inject 1 mL intramuscularly every 3 weeks. (Patient taking differently: Inject 200 mg into the appropriate muscle as directed by prescriber. Hasn't been taking it currently) 1 mL 3     No current facility-administered medications for this visit.     Allergies   Allergen Reactions   • Adhesive Tape Rash     Some tapes            Review of Systems   Constitutional: Negative.   HENT: Negative.         Sinus pain   Eyes: Negative.    Cardiovascular: Negative.    Respiratory: Negative.    Endocrine: Negative.    Hematologic/Lymphatic: Negative.    Skin: Negative.    Musculoskeletal: Positive for back pain, joint pain, joint swelling and neck pain.        Pertinent positives listed in HPI  "  Gastrointestinal: Negative.    Genitourinary: Negative.    Neurological: Negative.    Psychiatric/Behavioral: Negative.    Allergic/Immunologic: Negative.          Objective      Vitals:    03/11/22 0930   Weight: 132 kg (290 lb)   Height: 188 cm (74\")      Patient's Body mass index is 37.23 kg/m². indicating that he is obese (BMI >30). Obesity-related health conditions include the following: Listed in PMH. Obesity is unchanged. BMI is is above average; BMI management plan is completed. We discussed portion control and increasing exercise..      Physical Exam  Vitals and nursing note reviewed.   Constitutional:       General: He is not in acute distress.     Appearance: Normal appearance. He is not ill-appearing.   HENT:      Head: Normocephalic and atraumatic.      Right Ear: External ear normal.      Left Ear: External ear normal.      Nose: Nose normal.      Mouth/Throat:      Mouth: Mucous membranes are moist.      Pharynx: Oropharynx is clear.   Eyes:      Extraocular Movements: Extraocular movements intact.      Conjunctiva/sclera: Conjunctivae normal.      Pupils: Pupils are equal, round, and reactive to light.   Cardiovascular:      Rate and Rhythm: Normal rate.      Pulses: Normal pulses.   Pulmonary:      Effort: Pulmonary effort is normal.   Abdominal:      General: There is no distension.   Musculoskeletal:      Cervical back: Spasms, tenderness, bony tenderness and crepitus present. No swelling or rigidity. Pain with movement present. Decreased range of motion.      Comments: Examination today the patient's left shoulder reveals that he has return of the previous subacromial tenderness to palpation.  Patient has adequate forward elevation fully with minimal pain.  Abduction produces mild pain at 120 degrees.  No pain upon internal rotation at side. intact strength with Jobes maneuver and speeds test.  Gee and Neer's produce pain today.  Neurovascular status grossly intact left upper extremity.  " Cervical spine examination today reveals painful lateral rotation and forward flexion.  Patient has facet loading negative left-sided with positive triceps reflexes intact.  Longstanding bilateral hand and wrist numbness with Phalen's and Tinel's sign.     Skin:     General: Skin is warm and dry.      Capillary Refill: Capillary refill takes less than 2 seconds.   Neurological:      General: No focal deficit present.      Mental Status: He is alert and oriented to person, place, and time.      Cranial Nerves: Cranial nerves are intact.   Psychiatric:         Mood and Affect: Mood normal.         Behavior: Behavior normal.               Radiology:      Cervical spine radiographs once again reviewed revealing multilevel degenerative change with loss of cervical lordosis.  No acute fractures or dislocations noted. No acute osseous abnormality    MRI Cervical Spine Without Contrast    Result Date: 3/4/2022  Disc bulge at C5-C6 causing mild central canal narrowing.   This report was finalized on 3/4/2022 2:28 PM by Dr. Dat Yoder MD.          Assessment/Plan        ICD-10-CM ICD-9-CM   1. Impingement syndrome of left shoulder  M75.42 726.2   2. Cervical spine pain  M54.2 723.1   3. Left shoulder pain, unspecified chronicity  M25.512 719.41         56-year-old male with notable left shoulder impingement as well as continued cervicalgia and left-sided jradicular component symptoms.  The patient has undergone physical therapy as well as previous oral steroidal medication with some temporary alleviation.  He still has impingement symptoms of the left shoulder following the subacromial steroidal injection.  MRI cervical spine reveals mild degenerative change with disc bulge at C5/6 with mild central canal narrowing.  This does not represent main source behind the patient's upper extremity pain.  As result of this the patient was given orders to undergo diagnostic MRI left shoulder.  He has remained recalcitrant to  conservative treatment interventional shoulder including injections, therapy, activity modification, etc.  He will return back for the patient MRI for further evaluation.         This document was signed by Jay Roy PA-C March 11, 2022     CC: Edith Sloan APRN EMR Dragon/Transcription disclaimer:  Part of this note may be completed utilizing the dragon speech recognition software. This electronic transcription/translation of spoken language to printed text may contain grammatical errors, random word insertions, pronoun errors, and incomplete sentences or occasional consequences of the system due to software limitations, ambient noise, and hardware issues.  Any questions or concerns about the content, text, or information contained within the body of this dictation should be directly addressed to the physician for clarification.

## 2022-04-15 ENCOUNTER — APPOINTMENT (OUTPATIENT)
Dept: MRI IMAGING | Facility: HOSPITAL | Age: 57
End: 2022-04-15

## 2022-06-17 ENCOUNTER — HOSPITAL ENCOUNTER (OUTPATIENT)
Dept: MRI IMAGING | Facility: HOSPITAL | Age: 57
Discharge: HOME | End: 2022-06-17

## 2022-06-17 ENCOUNTER — TELEPHONE (OUTPATIENT)
Dept: ORTHOPEDIC SURGERY | Facility: CLINIC | Age: 57
End: 2022-06-17

## 2022-06-17 DIAGNOSIS — M25.512 LEFT SHOULDER PAIN, UNSPECIFIED CHRONICITY: ICD-10-CM

## 2022-06-17 DIAGNOSIS — M75.42 IMPINGEMENT SYNDROME OF LEFT SHOULDER: ICD-10-CM

## 2022-06-17 NOTE — TELEPHONE ENCOUNTER
Caller: PATIENT     Relationship to patient: SELF     Best call back number: [phone]    Patient is needing: PATIENT WENT FOR MRI OF LEFT SHOULDER TODAY AT [facility] AND HE COULD NOT FIT THE MACHINE. HE WOULD LIKE TO KNOW WHERE ELSE HE CAN GO TO HAVE THIS DONE - THEY RECOMMENDED [facility]. PLEASE CALL TO DISCUSS.

## 2022-06-21 ENCOUNTER — TELEPHONE (OUTPATIENT)
Dept: ORTHOPEDIC SURGERY | Facility: CLINIC | Age: 57
End: 2022-06-21

## 2022-06-21 NOTE — TELEPHONE ENCOUNTER
Called patient about the MRI issue. He said he needs the appt to be on a Friday, told him that I will make note of that when I fax over the information. I will make a list of places around [place] and let him choose. Verbalized understanding.

## 2022-07-08 ENCOUNTER — TELEPHONE (OUTPATIENT)
Dept: ORTHOPEDIC SURGERY | Facility: CLINIC | Age: 57
End: 2022-07-08

## 2022-07-08 NOTE — TELEPHONE ENCOUNTER
Provider: KIEL  Caller: SINGH   Phone Number: [phone]    Reason for Call: ANIL FREEMAN IS TRYING TO GET BACK IN HOLD WITH YOU ABOUT GETTING HIS MRI SCHEDULE. PLEASE CALL HIM ASAP.

## 2022-07-13 ENCOUNTER — TELEPHONE (OUTPATIENT)
Dept: ORTHOPEDIC SURGERY | Facility: CLINIC | Age: 57
End: 2022-07-13

## 2022-07-13 NOTE — TELEPHONE ENCOUNTER
Spoke with patient and asked if could [facility] [place] for the open MRI, he could. I told him he's got an appt on 7/22 at 10:45 but that didn't work because his Rheumatology appt was moved to that day. I called [facility] in [place] got a new date, called the patient back gave him the new appt 7/29 at 9:00. He was good with that. Told him to call if he has any problems. Verbalized understanding.

## 2022-07-15 ENCOUNTER — LAB (OUTPATIENT)
Dept: LAB | Facility: HOSPITAL | Age: 57
End: 2022-07-15

## 2022-07-15 ENCOUNTER — TRANSCRIBE ORDERS (OUTPATIENT)
Dept: ADMINISTRATIVE | Facility: HOSPITAL | Age: 57
End: 2022-07-15

## 2022-07-15 DIAGNOSIS — Z79.899 ENCOUNTER FOR LONG-TERM (CURRENT) USE OF OTHER MEDICATIONS: ICD-10-CM

## 2022-07-15 DIAGNOSIS — M05.79 SEROPOSITIVE RHEUMATOID ARTHRITIS OF MULTIPLE SITES: Primary | ICD-10-CM

## 2022-07-15 DIAGNOSIS — M05.79 SEROPOSITIVE RHEUMATOID ARTHRITIS OF MULTIPLE SITES: ICD-10-CM

## 2022-07-15 LAB
ALBUMIN SERPL-MCNC: 3.7 G/DL (ref 3.5–5.2)
ALBUMIN/GLOB SERPL: 1.3 G/DL
ALP SERPL-CCNC: 62 U/L (ref 39–117)
ALT SERPL W P-5'-P-CCNC: 49 U/L (ref 1–41)
ANION GAP SERPL CALCULATED.3IONS-SCNC: 12.2 MMOL/L (ref 5–15)
AST SERPL-CCNC: 39 U/L (ref 1–40)
BASOPHILS # BLD AUTO: 0.09 10*3/MM3 (ref 0–0.2)
BASOPHILS NFR BLD AUTO: 1 % (ref 0–1.5)
BILIRUB SERPL-MCNC: 0.5 MG/DL (ref 0–1.2)
BUN SERPL-MCNC: 13 MG/DL (ref 6–20)
BUN/CREAT SERPL: 8.4 (ref 7–25)
CALCIUM SPEC-SCNC: 9 MG/DL (ref 8.6–10.5)
CHLORIDE SERPL-SCNC: 103 MMOL/L (ref 98–107)
CO2 SERPL-SCNC: 23.8 MMOL/L (ref 22–29)
CREAT SERPL-MCNC: 1.54 MG/DL (ref 0.76–1.27)
CRP SERPL-MCNC: 0.71 MG/DL (ref 0–0.5)
DEPRECATED RDW RBC AUTO: 45.2 FL (ref 37–54)
EGFRCR SERPLBLD CKD-EPI 2021: 52.3 ML/MIN/1.73
EOSINOPHIL # BLD AUTO: 0.35 10*3/MM3 (ref 0–0.4)
EOSINOPHIL NFR BLD AUTO: 3.9 % (ref 0.3–6.2)
ERYTHROCYTE [DISTWIDTH] IN BLOOD BY AUTOMATED COUNT: 14 % (ref 12.3–15.4)
ERYTHROCYTE [SEDIMENTATION RATE] IN BLOOD: 27 MM/HR (ref 0–20)
GLOBULIN UR ELPH-MCNC: 2.9 GM/DL
GLUCOSE SERPL-MCNC: 109 MG/DL (ref 65–99)
HCT VFR BLD AUTO: 42.2 % (ref 37.5–51)
HGB BLD-MCNC: 14.3 G/DL (ref 13–17.7)
IMM GRANULOCYTES # BLD AUTO: 0.09 10*3/MM3 (ref 0–0.05)
IMM GRANULOCYTES NFR BLD AUTO: 1 % (ref 0–0.5)
LYMPHOCYTES # BLD AUTO: 2.48 10*3/MM3 (ref 0.7–3.1)
LYMPHOCYTES NFR BLD AUTO: 27.4 % (ref 19.6–45.3)
MCH RBC QN AUTO: 30.6 PG (ref 26.6–33)
MCHC RBC AUTO-ENTMCNC: 33.9 G/DL (ref 31.5–35.7)
MCV RBC AUTO: 90.4 FL (ref 79–97)
MONOCYTES # BLD AUTO: 0.91 10*3/MM3 (ref 0.1–0.9)
MONOCYTES NFR BLD AUTO: 10.1 % (ref 5–12)
NEUTROPHILS NFR BLD AUTO: 5.12 10*3/MM3 (ref 1.7–7)
NEUTROPHILS NFR BLD AUTO: 56.6 % (ref 42.7–76)
NRBC BLD AUTO-RTO: 0.1 /100 WBC (ref 0–0.2)
PLATELET # BLD AUTO: 263 10*3/MM3 (ref 140–450)
PMV BLD AUTO: 10.4 FL (ref 6–12)
POTASSIUM SERPL-SCNC: 4.6 MMOL/L (ref 3.5–5.2)
PROT SERPL-MCNC: 6.6 G/DL (ref 6–8.5)
RBC # BLD AUTO: 4.67 10*6/MM3 (ref 4.14–5.8)
SODIUM SERPL-SCNC: 139 MMOL/L (ref 136–145)
WBC NRBC COR # BLD: 9.04 10*3/MM3 (ref 3.4–10.8)

## 2022-07-15 PROCEDURE — 36415 COLL VENOUS BLD VENIPUNCTURE: CPT

## 2022-07-15 PROCEDURE — 85025 COMPLETE CBC W/AUTO DIFF WBC: CPT

## 2022-07-15 PROCEDURE — 80053 COMPREHEN METABOLIC PANEL: CPT

## 2022-07-15 PROCEDURE — 86140 C-REACTIVE PROTEIN: CPT

## 2022-07-15 PROCEDURE — 85652 RBC SED RATE AUTOMATED: CPT

## 2022-07-22 ENCOUNTER — TRANSCRIBE ORDERS (OUTPATIENT)
Dept: LAB | Facility: HOSPITAL | Age: 57
End: 2022-07-22

## 2022-07-22 ENCOUNTER — LAB (OUTPATIENT)
Dept: LAB | Facility: HOSPITAL | Age: 57
End: 2022-07-22

## 2022-07-22 DIAGNOSIS — M05.79 SEROPOSITIVE RHEUMATOID ARTHRITIS OF MULTIPLE SITES: ICD-10-CM

## 2022-07-22 DIAGNOSIS — G93.32 CHRONIC FATIGUE SYNDROME: ICD-10-CM

## 2022-07-22 DIAGNOSIS — M05.79 SEROPOSITIVE RHEUMATOID ARTHRITIS OF MULTIPLE SITES: Primary | ICD-10-CM

## 2022-07-22 PROCEDURE — 86480 TB TEST CELL IMMUN MEASURE: CPT

## 2022-07-22 PROCEDURE — 36415 COLL VENOUS BLD VENIPUNCTURE: CPT

## 2022-07-26 LAB
GAMMA INTERFERON BACKGROUND BLD IA-ACNC: 0 IU/ML
M TB IFN-G BLD-IMP: NEGATIVE
M TB IFN-G CD4+ BCKGRND COR BLD-ACNC: 0 IU/ML
M TB IFN-G CD4+CD8+ BCKGRND COR BLD-ACNC: 0 IU/ML
MITOGEN IGNF BLD-ACNC: >10 IU/ML
SERVICE CMNT-IMP: NORMAL

## 2022-08-05 ENCOUNTER — OFFICE VISIT (OUTPATIENT)
Dept: ORTHOPEDIC SURGERY | Facility: CLINIC | Age: 57
End: 2022-08-05

## 2022-08-05 VITALS — WEIGHT: 290 LBS | HEIGHT: 74 IN | BODY MASS INDEX: 37.22 KG/M2

## 2022-08-05 DIAGNOSIS — M75.42 IMPINGEMENT SYNDROME OF LEFT SHOULDER: ICD-10-CM

## 2022-08-05 DIAGNOSIS — M54.2 CERVICAL SPINE PAIN: ICD-10-CM

## 2022-08-05 DIAGNOSIS — M75.122 NONTRAUMATIC COMPLETE TEAR OF LEFT ROTATOR CUFF: Primary | ICD-10-CM

## 2022-08-05 PROCEDURE — 99213 OFFICE O/P EST LOW 20 MIN: CPT | Performed by: PHYSICIAN ASSISTANT

## 2022-08-05 RX ORDER — FOLIC ACID 1 MG/1
TABLET ORAL
COMMUNITY
Start: 2022-08-01 | End: 2023-01-13 | Stop reason: ALTCHOICE

## 2022-08-05 RX ORDER — FINASTERIDE 5 MG/1
TABLET, FILM COATED ORAL
COMMUNITY
Start: 2022-07-13

## 2022-08-05 RX ORDER — METHOTREXATE 2.5 MG/1
TABLET ORAL
COMMUNITY
Start: 2022-08-01 | End: 2022-09-23

## 2022-08-05 NOTE — PROGRESS NOTES
[facility] Established Patient Visit          Patient: Jose R Hilliard  YOB: 1965  Date of Encounter: 8/5/2022  PCP: Edith Sloan APRN      Subjective     Chief Complaint   Patient presents with   • Left Shoulder - Follow-up, Pain           History of Present Illness:     Jose R Hilliard is a 57 y.o. male presents today secondary to ongoing left shoulder pain as well as cervicalgia.  The patient has seen temporary improvement with the previous Medrol Dosepak temporarily alleviating his pain into the left shoulder and neck with return of the symptoms.  Most recent subacromial injection to left shoulder was of no benefit.  He continues to have left upper extremity pain with range of motion and abduction of the left shoulder.  Patient reports continued left upper extremity numbness and tingling with cervicalgia. Patient has difficulty upon range of motion of the cervical spine to the right.  Prolonged sitting and activity tends to exacerbate this.  He also has trouble sleeping as result.  Left shoulder pain has returned with lateral shoulder pain radiating down to the mid upper arm worse upon positional overhead activities.  Denies any other new complaints. He presents for MRI evaluation left shoulder.         There is no problem list on file for this patient.    Past Medical History:   Diagnosis Date   • BPH (benign prostatic hypertrophy) with urinary obstruction    • Disease of thyroid gland    • Goiter    • History of hyperlipidemia    • History of hypertension    • History of osteoarthritis    • Hypogonadism in male    • Polycythemia      Past Surgical History:   Procedure Laterality Date   • CHOLECYSTECTOMY       Social History     Occupational History   • Not on file   Tobacco Use   • Smoking status: Never Smoker   • Smokeless tobacco: Never Used   Vaping Use   • Vaping Use: Never used   Substance and Sexual Activity   • Alcohol use: No   • Drug use: No   • Sexual activity: Defer     "Jose R Hilliard  reports that he has never smoked. He has never used smokeless tobacco.. I have educated him on the risk of diseases from using tobacco products such as cancer, COPD and heart disease.          Social History     Social History Narrative   • Not on file     Family History   Problem Relation Age of Onset   • No Known Problems Father    • No Known Problems Mother    • Stroke Paternal Grandfather    • Diabetes Paternal Grandfather      Current Outpatient Medications   Medication Sig Dispense Refill   • aspirin (KP ASPIRIN) 81 MG EC tablet Take 1 tablet by mouth daily 30 tablet 2   • bisoprolol-hydrochlorothiazide (ZIAC) 5-6.25 MG per tablet Take 1 tablet by mouth daily 30 tablet 2   • finasteride (PROSCAR) 5 MG tablet      • folic acid (FOLVITE) 1 MG tablet      • gabapentin (NEURONTIN) 600 MG tablet      • leflunomide (ARAVA) 20 MG tablet Take 20 mg by mouth Daily.     • levothyroxine (SYNTHROID, LEVOTHROID) 88 MCG tablet Take 1 tablet by mouth daily (Patient taking differently: Take 50 mcg by mouth Daily.) 30 tablet 2   • meloxicam (MOBIC) 7.5 MG tablet Take 2 tablets by mouth Daily. 60 tablet 2   • methotrexate 2.5 MG tablet        No current facility-administered medications for this visit.     Allergies   Allergen Reactions   • Adhesive Tape Rash     Some tapes            Review of Systems   Constitutional: Negative.   HENT: Negative.         Sinus pain   Eyes: Negative.    Cardiovascular: Negative.    Respiratory: Negative.    Endocrine: Negative.    Hematologic/Lymphatic: Negative.    Skin: Negative.    Musculoskeletal: Positive for back pain, joint pain, joint swelling and neck pain.        Pertinent positives listed in HPI   Gastrointestinal: Negative.    Genitourinary: Negative.    Neurological: Negative.    Psychiatric/Behavioral: Negative.    Allergic/Immunologic: Negative.          Objective      Vitals:    08/05/22 0838   Weight: 132 kg (290 lb)   Height: 188 cm (74\")      Patient's Body mass " index is 37.23 kg/m². indicating that he is obese (BMI >30). Obesity-related health conditions include the following: Listed in PMH. Obesity is unchanged. BMI is is above average; BMI management plan is completed. We discussed portion control and increasing exercise..      Physical Exam  Vitals and nursing note reviewed.   Constitutional:       General: He is not in acute distress.     Appearance: Normal appearance. He is not ill-appearing.   HENT:      Head: Normocephalic and atraumatic.      Right Ear: External ear normal.      Left Ear: External ear normal.      Nose: Nose normal.      Mouth/Throat:      Mouth: Mucous membranes are moist.      Pharynx: Oropharynx is clear.   Eyes:      Extraocular Movements: Extraocular movements intact.      Conjunctiva/sclera: Conjunctivae normal.      Pupils: Pupils are equal, round, and reactive to light.   Cardiovascular:      Rate and Rhythm: Normal rate.      Pulses: Normal pulses.   Pulmonary:      Effort: Pulmonary effort is normal.   Abdominal:      General: There is no distension.   Musculoskeletal:      Cervical back: Spasms, tenderness, bony tenderness and crepitus present. No swelling or rigidity. Pain with movement present. Decreased range of motion.      Comments: Examination today the patient's left shoulder reveals that he has return of the previous subacromial tenderness to palpation.  Patient has adequate forward elevation fully with minimal pain.  Abduction produces mild pain at 120 degrees.  No pain upon internal rotation at side. intact strength with Jobes maneuver and speeds test.  Gee and Neer's produce pain today.  Neurovascular status grossly intact left upper extremity.  Cervical spine examination today reveals painful lateral rotation and forward flexion.  Patient has facet loading negative left-sided with positive triceps reflexes intact.  Longstanding bilateral hand and wrist numbness with Phalen's and Tinel's sign.     Skin:     General: Skin  is warm and dry.      Capillary Refill: Capillary refill takes less than 2 seconds.   Neurological:      General: No focal deficit present.      Mental Status: He is alert and oriented to person, place, and time.      Cranial Nerves: Cranial nerves are intact.   Psychiatric:         Mood and Affect: Mood normal.         Behavior: Behavior normal.               Radiology:      Cervical spine radiographs once again reviewed revealing multilevel degenerative change with loss of cervical lordosis.  No acute fractures or dislocations noted. No acute osseous abnormality    MRI from outside source [facility] [place] left shoulder 7/29/2022 reveals chronic rotator cuff tear with complete retracted supraspinatus and infraspinatus insertional tendon tears.  Mild bursitis.  Marked fatty atrophy present in his muscle.  Moderate fatty atrophy supraspinatus muscle.  Elevated humerus.  AC arthropathy with low-lying acromion.  Outlet narrowing centrally Lachman elevation.  There is approximately 3 cm retraction of the fibers with scarring.      Assessment/Plan        ICD-10-CM ICD-9-CM   1. Nontraumatic complete tear of left rotator cuff  M75.122 727.61   2. Impingement syndrome of left shoulder  M75.42 726.2   3. Cervical spine pain  M54.2 723.1         56-year-old male with notable left shoulder impingement as well as MRI evidence of chronic supraspinatustear with retraction and fatty infiltrate.  Further discussion was had with patient he continues to have ongoing pain symptoms recalcitrant to conservative treatment.  As result of this the patient was provided with an order for referral to Dr. Weinberg for surgical consultation.  Patient is knowing understanding of the findings and with his current occupation he is able to modify and alter his activity accordingly.  He wishes to discuss surgical invention with Dr. Weinberg as well as potential for likelihood of return back to work after 90 days time.  No direct surgical invention  cervical spine we will continue to monitor this closely.  We will follow-up on completion consultation left shoulder.         This document was signed by Jay Roy PA-C August 5, 2022     CC: Edith Sloan APRN EMR Dragon/Transcription disclaimer:  Part of this note may be completed utilizing the dragon speech recognition software. This electronic transcription/translation of spoken language to printed text may contain grammatical errors, random word insertions, pronoun errors, and incomplete sentences or occasional consequences of the system due to software limitations, ambient noise, and hardware issues.  Any questions or concerns about the content, text, or information contained within the body of this dictation should be directly addressed to the physician for clarification.

## 2022-08-15 NOTE — PATIENT INSTRUCTIONS
Faxed  Lulu El,      Therapeutic Phlebotomy  Therapeutic phlebotomy is the controlled removal of blood from a person's body for the purpose of treating a medical condition. The procedure is similar to donating blood. Usually, about a pint (470 mL, or 0.47L) of blood is removed. The average adult has 9-12 pints (4.3-5.7 L) of blood.  Therapeutic phlebotomy may be used to treat the following medical conditions:  · Hemochromatosis. This is a condition in which the blood contains too much iron.  · Polycythemia vera. This is a condition in which the blood contains too many red blood cells.  · Porphyria cutanea tarda. This is a disease in which an important part of hemoglobin is not made properly. It results in the buildup of abnormal amounts of porphyrins in the body.  · Sickle cell disease. This is a condition in which the red blood cells form an abnormal crescent shape rather than a round shape.  LET YOUR HEALTH CARE PROVIDER KNOW ABOUT:  · Any allergies you have.  · All medicines you are taking, including vitamins, herbs, eye drops, creams, and over-the-counter medicines.  · Previous problems you or members of your family have had with the use of anesthetics.  · Any blood disorders you have.  · Previous surgeries you have had.  · Any medical conditions you may have.  RISKS AND COMPLICATIONS  Generally, this is a safe procedure. However, problems may occur, including:  · Nausea or light-headedness.  · Low blood pressure.  · Soreness, bleeding, swelling, or bruising at the needle insertion site.  · Infection.  BEFORE THE PROCEDURE  · Follow instructions from your health care provider about eating or drinking restrictions.  · Ask your health care provider about changing or stopping your regular medicines. This is especially important if you are taking diabetes medicines or blood thinners.  · Wear clothing with sleeves that can be raised above the elbow.  · Plan to have someone take you home after the procedure.  · You may have a blood sample  taken.  PROCEDURE  · A needle will be inserted into one of your veins.  · Tubing and a collection bag will be attached to that needle.  · Blood will flow through the needle and tubing into the collection bag.  · You may be asked to open and close your hand slowly and continually during the entire collection.  · After the specified amount of blood has been removed from your body, the collection bag and tubing will be clamped.  · The needle will be removed from your vein.  · Pressure will be held on the site of the needle insertion to stop the bleeding.  · A bandage (dressing) will be placed over the needle insertion site.  The procedure may vary among health care providers and hospitals.  AFTER THE PROCEDURE  · Your recovery will be assessed and monitored.  · You can return to your normal activities as directed by your health care provider.     This information is not intended to replace advice given to you by your health care provider. Make sure you discuss any questions you have with your health care provider.     Document Released: 05/21/2012 Document Revised: 05/03/2016 Document Reviewed: 12/14/2015  [employer] Interactive Patient Education ©2016 [employer]  Therapeutic Phlebotomy, Care After  Refer to this sheet in the next few weeks. These instructions provide you with information about caring for yourself after your procedure. Your health care provider may also give you more specific instructions. Your treatment has been planned according to current medical practices, but problems sometimes occur. Call your health care provider if you have any problems or questions after your procedure.  WHAT TO EXPECT AFTER THE PROCEDURE  After your procedure, it is common to have:  · Light-headedness or dizziness. You may feel faint.  · Nausea.  · Tiredness.  HOME CARE INSTRUCTIONS  Activities  · Return to your normal activities as directed by your health care provider. Most people can go back to their normal activities  right away.  · Avoid strenuous physical activity and heavy lifting or pulling for about 5 hours after the procedure. Do not lift anything that is heavier than 10 lb (4.5 kg).  · Athletes should avoid strenuous exercise for at least 12 hours.  · Change positions slowly for the remainder of the day. This will help to prevent light-headedness or fainting.  · If you feel light-headed, lie down until the feeling goes away.  Eating and Drinking  · Be sure to eat well-balanced meals for the next 24 hours.  · Drink enough fluid to keep your urine clear or pale yellow.  · Avoid drinking alcohol on the day that you had the procedure.  Care of the Needle Insertion Site  · Keep your bandage dry. You can remove the bandage after about 5 hours or as directed by your health care provider.  · If you have bleeding from the needle insertion site, elevate your arm and press firmly on the site until the bleeding stops.  · If you have bruising at the site, apply ice to the area:    Put ice in a plastic bag.    Place a towel between your skin and the bag.    Leave the ice on for 20 minutes, 2-3 times a day for the first 24 hours.  · If the swelling does not go away after 24 hours, apply a warm, moist washcloth to the area for 20 minutes, 2-3 times a day.  General Instructions  · Avoid smoking for at least 30 minutes after the procedure.  · Keep all follow-up visits as directed by your health care provider. It is important to continue with further therapeutic phlebotomy treatments as directed.  SEEK MEDICAL CARE IF:  · You have redness, swelling, or pain at the needle insertion site.  · You have fluid, blood, or pus coming from the needle insertion site.  · You feel light-headed, dizzy, or nauseated, and the feeling does not go away.  · You notice new bruising at the needle insertion site.  · You feel weaker than normal.  · You have a fever or chills.  SEEK IMMEDIATE MEDICAL CARE IF:  · You have severe nausea or vomiting.  · You have  chest pain.  · You have trouble breathing.     This information is not intended to replace advice given to you by your health care provider. Make sure you discuss any questions you have with your health care provider.     Document Released: 05/21/2012 Document Revised: 05/03/2016 Document Reviewed: 12/14/2015  [employer] Interactive Patient Education ©2016 [employer]  Hemochromatosis  Hemochromatosis, also called iron storage disease, is a condition in which the body stores too much iron. The excess iron builds up in your joints, heart, liver, pancreas, and other organs and damages them.  CAUSES   Hemochromatosis may be caused by:  · Abnormal genes. These are passed down (inherited) from both of your parents.  · Having blood transfusions.  · Having too much iron in your diet.  RISK FACTORS  · Being .  · Inheriting abnormal genes from both your parents.  · Having a severe or long-term loss of red blood cells (anemia).  SIGNS AND SYMPTOMS   Signs and symptoms can start at any age, but usually start in middle age. They may include:  · Fatigue.  · Weakness.  · Joint pain.  · Abdominal pain.  · Weight loss.  · Gray or bronze skin coloring.  · Loss of interest in sex.  · Loss of menstrual periods in women.  · Loss of body hair.  · Shortness of breath.  Late signs of hemochromatosis include damage to the liver, heart, or pancreas. This may lead to:  · Liver cancer.  · Abnormal heart rhythms.  · Heart failure.  · Diabetes.  DIAGNOSIS   Your health care provider will perform a physical exam and ask about your symptoms and family history. Tests may be done to confirm the diagnosis. Blood tests may include:  · Transferrin saturation. This test measures how much iron is bound to hemoglobin in your blood. Hemoglobin is a substance in red blood cells that carries oxygen to the tissues of the body.  · Serum ferritin. This test measures a protein that stores iron in your blood.  · A test to check for the abnormal genes  that cause the condition.  You may have a tissue sample taken from your liver with a needle (biopsy) for testing. The results will show if iron is building up in your liver.  TREATMENT   Hemochromatosis is treated by removing iron by taking blood (phlebotomy). Having a phlebotomy is similar to having blood taken for donation. The procedure is simple and effective as long as it is started before organ damage develops.  When you begin treatment, you may have a pint of blood removed once or twice a week. You may have blood tests done to determine when your iron levels return to normal. Once your iron levels are normal, you may only need to have a phlebotomy every few months.  HOME CARE INSTRUCTIONS   · Do not eat a lot of foods that are high in iron. Iron-rich foods include red meats and organ meats.  · Do not take dietary supplements that contain iron.  · Do not take vitamin C supplements. Vitamin C increases iron absorption.  · Do not eat raw shellfish or raw fish. Hemochromatosis may increase your chance of infection from these foods.  · If you have liver damage, do not drink any alcohol.  · If you do not have liver damage, limit alcohol intake to no more than 1 drink per day for nonpregnant women and 2 drinks per day for men. One drink equals 12 ounces of beer, 5 ounces of wine, or 1½ ounces of hard liquor.  · Try to exercise for at least 30 minutes on most days of the week.  · Keep all follow-up visits as directed by your health care provider. This is important.  SEEK MEDICAL CARE IF:  · You have fatigue.  · You have weakness.  · You have joint pain.  · You have abdominal pain.  · You experience weight loss.  · You have shortness of breath.  SEEK IMMEDIATE MEDICAL CARE IF:  · You have chest pain.  · You have trouble breathing.     This information is not intended to replace advice given to you by your health care provider. Make sure you discuss any questions you have with your health care provider.     Document  Released: 12/15/2001 Document Revised: 01/08/2016 Document Reviewed: 02/11/2015  [employer] Interactive Patient Education ©2016 [employer]  Hemochromatosis  Hemochromatosis, also called iron storage disease, is a condition in which the body stores too much iron. The excess iron builds up in your joints, heart, liver, pancreas, and other organs and damages them.  CAUSES   Hemochromatosis may be caused by:  · Abnormal genes. These are passed down (inherited) from both of your parents.  · Having blood transfusions.  · Having too much iron in your diet.  RISK FACTORS  · Being .  · Inheriting abnormal genes from both your parents.  · Having a severe or long-term loss of red blood cells (anemia).  SIGNS AND SYMPTOMS   Signs and symptoms can start at any age, but usually start in middle age. They may include:  · Fatigue.  · Weakness.  · Joint pain.  · Abdominal pain.  · Weight loss.  · Gray or bronze skin coloring.  · Loss of interest in sex.  · Loss of menstrual periods in women.  · Loss of body hair.  · Shortness of breath.  Late signs of hemochromatosis include damage to the liver, heart, or pancreas. This may lead to:  · Liver cancer.  · Abnormal heart rhythms.  · Heart failure.  · Diabetes.  DIAGNOSIS   Your health care provider will perform a physical exam and ask about your symptoms and family history. Tests may be done to confirm the diagnosis. Blood tests may include:  · Transferrin saturation. This test measures how much iron is bound to hemoglobin in your blood. Hemoglobin is a substance in red blood cells that carries oxygen to the tissues of the body.  · Serum ferritin. This test measures a protein that stores iron in your blood.  · A test to check for the abnormal genes that cause the condition.  You may have a tissue sample taken from your liver with a needle (biopsy) for testing. The results will show if iron is building up in your liver.  TREATMENT   Hemochromatosis is treated by removing iron by  taking blood (phlebotomy). Having a phlebotomy is similar to having blood taken for donation. The procedure is simple and effective as long as it is started before organ damage develops.  When you begin treatment, you may have a pint of blood removed once or twice a week. You may have blood tests done to determine when your iron levels return to normal. Once your iron levels are normal, you may only need to have a phlebotomy every few months.  HOME CARE INSTRUCTIONS   · Do not eat a lot of foods that are high in iron. Iron-rich foods include red meats and organ meats.  · Do not take dietary supplements that contain iron.  · Do not take vitamin C supplements. Vitamin C increases iron absorption.  · Do not eat raw shellfish or raw fish. Hemochromatosis may increase your chance of infection from these foods.  · If you have liver damage, do not drink any alcohol.  · If you do not have liver damage, limit alcohol intake to no more than 1 drink per day for nonpregnant women and 2 drinks per day for men. One drink equals 12 ounces of beer, 5 ounces of wine, or 1½ ounces of hard liquor.  · Try to exercise for at least 30 minutes on most days of the week.  · Keep all follow-up visits as directed by your health care provider. This is important.  SEEK MEDICAL CARE IF:  · You have fatigue.  · You have weakness.  · You have joint pain.  · You have abdominal pain.  · You experience weight loss.  · You have shortness of breath.  SEEK IMMEDIATE MEDICAL CARE IF:  · You have chest pain.  · You have trouble breathing.     This information is not intended to replace advice given to you by your health care provider. Make sure you discuss any questions you have with your health care provider.     Document Released: 12/15/2001 Document Revised: 01/08/2016 Document Reviewed: 02/11/2015  [employer] Interactive Patient Education ©2016 [employer]  Hemochromatosis  Hemochromatosis, also called iron storage disease, is a condition in which the  body stores too much iron. The excess iron builds up in your joints, heart, liver, pancreas, and other organs and damages them.  CAUSES   Hemochromatosis may be caused by:  · Abnormal genes. These are passed down (inherited) from both of your parents.  · Having blood transfusions.  · Having too much iron in your diet.  RISK FACTORS  · Being .  · Inheriting abnormal genes from both your parents.  · Having a severe or long-term loss of red blood cells (anemia).  SIGNS AND SYMPTOMS   Signs and symptoms can start at any age, but usually start in middle age. They may include:  · Fatigue.  · Weakness.  · Joint pain.  · Abdominal pain.  · Weight loss.  · Gray or bronze skin coloring.  · Loss of interest in sex.  · Loss of menstrual periods in women.  · Loss of body hair.  · Shortness of breath.  Late signs of hemochromatosis include damage to the liver, heart, or pancreas. This may lead to:  · Liver cancer.  · Abnormal heart rhythms.  · Heart failure.  · Diabetes.  DIAGNOSIS   Your health care provider will perform a physical exam and ask about your symptoms and family history. Tests may be done to confirm the diagnosis. Blood tests may include:  · Transferrin saturation. This test measures how much iron is bound to hemoglobin in your blood. Hemoglobin is a substance in red blood cells that carries oxygen to the tissues of the body.  · Serum ferritin. This test measures a protein that stores iron in your blood.  · A test to check for the abnormal genes that cause the condition.  You may have a tissue sample taken from your liver with a needle (biopsy) for testing. The results will show if iron is building up in your liver.  TREATMENT   Hemochromatosis is treated by removing iron by taking blood (phlebotomy). Having a phlebotomy is similar to having blood taken for donation. The procedure is simple and effective as long as it is started before organ damage develops.  When you begin treatment, you may have a pint of  blood removed once or twice a week. You may have blood tests done to determine when your iron levels return to normal. Once your iron levels are normal, you may only need to have a phlebotomy every few months.  HOME CARE INSTRUCTIONS   · Do not eat a lot of foods that are high in iron. Iron-rich foods include red meats and organ meats.  · Do not take dietary supplements that contain iron.  · Do not take vitamin C supplements. Vitamin C increases iron absorption.  · Do not eat raw shellfish or raw fish. Hemochromatosis may increase your chance of infection from these foods.  · If you have liver damage, do not drink any alcohol.  · If you do not have liver damage, limit alcohol intake to no more than 1 drink per day for nonpregnant women and 2 drinks per day for men. One drink equals 12 ounces of beer, 5 ounces of wine, or 1½ ounces of hard liquor.  · Try to exercise for at least 30 minutes on most days of the week.  · Keep all follow-up visits as directed by your health care provider. This is important.  SEEK MEDICAL CARE IF:  · You have fatigue.  · You have weakness.  · You have joint pain.  · You have abdominal pain.  · You experience weight loss.  · You have shortness of breath.  SEEK IMMEDIATE MEDICAL CARE IF:  · You have chest pain.  · You have trouble breathing.     This information is not intended to replace advice given to you by your health care provider. Make sure you discuss any questions you have with your health care provider.     Document Released: 12/15/2001 Document Revised: 01/08/2016 Document Reviewed: 02/11/2015  [employer] Interactive Patient Education ©2016 [employer]    Polycythemia Vera  Polycythemia vera (PV), or myeloproliferative disease, is a form of blood cancer in which the bone marrow makes too many (overproduces) red blood cells. The bone marrow may also make too many clotting cells (platelets) and white blood cells. Bone marrow is the spongy center of bones where blood cells are  produced. Sometimes, there may be an overproduction of blood cells in the liver and spleen, causing those organs to become enlarged. Additionally, people who have PV are at a higher risk for stroke or heart attack because their blood may clot more easily. PV is a long-term disease.  CAUSES  Almost all people who have PV have an abnormal gene (genetic mutation) that causes changes in the way that the bone marrow makes blood cells. This gene, which is called JAK2, is not passed along from parent to child (is not hereditary). It is not known what triggers the genetic mutation that causes the body to produce too many red blood cells.  RISK FACTORS  This condition is more likely to develop in:  · Males.  · People who are 60 years of age or older.  SYMPTOMS  You may not have any symptoms in the early stage of PV. When symptoms develop, they may include:  · Shortness of breath.  · Dizziness.  · Hot and flushed skin.  · Itchy skin.  · Sweats, especially night sweats.  · Headache.  · Tiredness.  · Ringing in the ears.  · Blurred vision or blind spots.  · Bone pain.  · Weight loss.  · Fever.  · Blood-tinged vomit or bowel movements.  DIAGNOSIS  This condition may be diagnosed during a routine physical exam if you have a blood test called a complete blood count (CBC). Your health care provider also may suspect PV if you have symptoms. During the physical exam, your provider may find that you have an enlarged liver or spleen. You may also have tests to confirm the diagnosis. These may include:  · A procedure to remove a sample of bone marrow for testing (bone marrow biopsy).  · Blood tests to check for:    The JAK2 gene.    Low levels of a hormone that helps to regulate blood production (erythropoietin).  TREATMENT  There is no cure for PV, but treatment can help to control the disease. There are several types of treatment. No single treatment works for everyone. You will need to work with a blood cancer specialist  (hematologist) to find the treatment that is best for you. Options include:  · Periodically having some blood removed with a needle (drawn) to lower the number of red blood cells (phlebotomy).  · Medicine. Your health care provider may recommend:    Low-dose aspirin to lower your risk for blood clots.    A medicine to reduce red blood cell production (hydroxyurea).    A medicine to lower the number of red blood cells (interferon).    A medicine that slows down the effects of JAK2 (ruxolitinib).  HOME CARE INSTRUCTIONS  · Take over-the-counter and prescription medicines only as told by your health care provider.  · Return to your normal activities as told by your health care provider. Ask your health care provider what activities are safe for you.  · Do not use tobacco products, including cigarettes, chewing tobacco, or e-cigarettes. If you need help quitting, ask your health care provider.  · Keep all follow-up visits as told by your health care provider. This is important.  SEEK MEDICAL CARE IF:  · You have side effects from your medicines.  · Your symptoms change or get worse at home.  · You have blood in your stool or you vomit blood.  SEEK IMMEDIATE MEDICAL CARE IF:  · You have sudden and severe pain in your abdomen.  · You have chest pain or difficulty breathing.  · You have signs of stroke, such as:    Sudden numbness.    Weakness of your face or arm.    Confusion.    Difficulty speaking or understanding speech.  These symptoms may represent a serious problem that is an emergency. Do not wait to see if the symptoms will go away. Get medical help right away. Call your local emergency services (911 in the [place] Do not drive yourself to the hospital.     This information is not intended to replace advice given to you by your health care provider. Make sure you discuss any questions you have with your health care provider.     Document Released: 09/12/2002 Document Revised: 11/28/2016 Document Reviewed:  06/29/2016  [employer] Interactive Patient Education ©2016 [employer]

## 2022-09-02 ENCOUNTER — TRANSCRIBE ORDERS (OUTPATIENT)
Dept: ADMINISTRATIVE | Facility: HOSPITAL | Age: 57
End: 2022-09-02

## 2022-09-02 ENCOUNTER — LAB (OUTPATIENT)
Dept: LAB | Facility: HOSPITAL | Age: 57
End: 2022-09-02

## 2022-09-02 DIAGNOSIS — Z79.899 ENCOUNTER FOR LONG-TERM (CURRENT) USE OF OTHER MEDICATIONS: ICD-10-CM

## 2022-09-02 DIAGNOSIS — M06.9 RHEUMATOID ARTHRITIS, INVOLVING UNSPECIFIED SITE, UNSPECIFIED WHETHER RHEUMATOID FACTOR PRESENT: Primary | ICD-10-CM

## 2022-09-02 DIAGNOSIS — M06.9 RHEUMATOID ARTHRITIS, INVOLVING UNSPECIFIED SITE, UNSPECIFIED WHETHER RHEUMATOID FACTOR PRESENT: ICD-10-CM

## 2022-09-02 LAB
ALBUMIN SERPL-MCNC: 3.4 G/DL (ref 3.5–5.2)
ALBUMIN/GLOB SERPL: 1.3 G/DL
ALP SERPL-CCNC: 60 U/L (ref 39–117)
ALT SERPL W P-5'-P-CCNC: 50 U/L (ref 1–41)
ANION GAP SERPL CALCULATED.3IONS-SCNC: 8.6 MMOL/L (ref 5–15)
AST SERPL-CCNC: 39 U/L (ref 1–40)
BASOPHILS # BLD AUTO: 0.06 10*3/MM3 (ref 0–0.2)
BASOPHILS NFR BLD AUTO: 0.8 % (ref 0–1.5)
BILIRUB SERPL-MCNC: 0.3 MG/DL (ref 0–1.2)
BUN SERPL-MCNC: 12 MG/DL (ref 6–20)
BUN/CREAT SERPL: 12.1 (ref 7–25)
CALCIUM SPEC-SCNC: 8.8 MG/DL (ref 8.6–10.5)
CHLORIDE SERPL-SCNC: 105 MMOL/L (ref 98–107)
CO2 SERPL-SCNC: 23.4 MMOL/L (ref 22–29)
CREAT SERPL-MCNC: 0.99 MG/DL (ref 0.76–1.27)
CRP SERPL-MCNC: 0.97 MG/DL (ref 0–0.5)
DEPRECATED RDW RBC AUTO: 48.5 FL (ref 37–54)
EGFRCR SERPLBLD CKD-EPI 2021: 88.9 ML/MIN/1.73
EOSINOPHIL # BLD AUTO: 0.33 10*3/MM3 (ref 0–0.4)
EOSINOPHIL NFR BLD AUTO: 4.3 % (ref 0.3–6.2)
ERYTHROCYTE [DISTWIDTH] IN BLOOD BY AUTOMATED COUNT: 14.4 % (ref 12.3–15.4)
ERYTHROCYTE [SEDIMENTATION RATE] IN BLOOD: 27 MM/HR (ref 0–20)
GLOBULIN UR ELPH-MCNC: 2.6 GM/DL
GLUCOSE SERPL-MCNC: 234 MG/DL (ref 65–99)
HCT VFR BLD AUTO: 41.7 % (ref 37.5–51)
HGB BLD-MCNC: 13.9 G/DL (ref 13–17.7)
IMM GRANULOCYTES # BLD AUTO: 0.07 10*3/MM3 (ref 0–0.05)
IMM GRANULOCYTES NFR BLD AUTO: 0.9 % (ref 0–0.5)
LYMPHOCYTES # BLD AUTO: 1.77 10*3/MM3 (ref 0.7–3.1)
LYMPHOCYTES NFR BLD AUTO: 23.1 % (ref 19.6–45.3)
MCH RBC QN AUTO: 31.4 PG (ref 26.6–33)
MCHC RBC AUTO-ENTMCNC: 33.3 G/DL (ref 31.5–35.7)
MCV RBC AUTO: 94.1 FL (ref 79–97)
MONOCYTES # BLD AUTO: 0.54 10*3/MM3 (ref 0.1–0.9)
MONOCYTES NFR BLD AUTO: 7 % (ref 5–12)
NEUTROPHILS NFR BLD AUTO: 4.89 10*3/MM3 (ref 1.7–7)
NEUTROPHILS NFR BLD AUTO: 63.9 % (ref 42.7–76)
NRBC BLD AUTO-RTO: 0 /100 WBC (ref 0–0.2)
PLATELET # BLD AUTO: 263 10*3/MM3 (ref 140–450)
PMV BLD AUTO: 10.6 FL (ref 6–12)
POTASSIUM SERPL-SCNC: 4.1 MMOL/L (ref 3.5–5.2)
PROT SERPL-MCNC: 6 G/DL (ref 6–8.5)
RBC # BLD AUTO: 4.43 10*6/MM3 (ref 4.14–5.8)
SODIUM SERPL-SCNC: 137 MMOL/L (ref 136–145)
WBC NRBC COR # BLD: 7.66 10*3/MM3 (ref 3.4–10.8)

## 2022-09-02 PROCEDURE — 80053 COMPREHEN METABOLIC PANEL: CPT

## 2022-09-02 PROCEDURE — 86140 C-REACTIVE PROTEIN: CPT

## 2022-09-02 PROCEDURE — 85652 RBC SED RATE AUTOMATED: CPT

## 2022-09-02 PROCEDURE — 36415 COLL VENOUS BLD VENIPUNCTURE: CPT

## 2022-09-02 PROCEDURE — 85025 COMPLETE CBC W/AUTO DIFF WBC: CPT

## 2022-09-09 ENCOUNTER — LAB (OUTPATIENT)
Dept: LAB | Facility: HOSPITAL | Age: 57
End: 2022-09-09

## 2022-09-09 ENCOUNTER — TRANSCRIBE ORDERS (OUTPATIENT)
Dept: LAB | Facility: HOSPITAL | Age: 57
End: 2022-09-09

## 2022-09-09 DIAGNOSIS — Z79.899 ENCOUNTER FOR LONG-TERM (CURRENT) USE OF OTHER MEDICATIONS: ICD-10-CM

## 2022-09-09 DIAGNOSIS — M05.79 SEROPOSITIVE RHEUMATOID ARTHRITIS OF MULTIPLE SITES: ICD-10-CM

## 2022-09-09 DIAGNOSIS — M05.79 SEROPOSITIVE RHEUMATOID ARTHRITIS OF MULTIPLE SITES: Primary | ICD-10-CM

## 2022-09-09 PROCEDURE — 86140 C-REACTIVE PROTEIN: CPT

## 2022-09-09 PROCEDURE — 80053 COMPREHEN METABOLIC PANEL: CPT

## 2022-09-09 PROCEDURE — 85025 COMPLETE CBC W/AUTO DIFF WBC: CPT

## 2022-09-09 PROCEDURE — 85652 RBC SED RATE AUTOMATED: CPT

## 2022-09-09 PROCEDURE — 36415 COLL VENOUS BLD VENIPUNCTURE: CPT

## 2022-09-10 LAB
ALBUMIN SERPL-MCNC: 3.8 G/DL (ref 3.5–5.2)
ALBUMIN/GLOB SERPL: 1.3 G/DL
ALP SERPL-CCNC: 60 U/L (ref 39–117)
ALT SERPL W P-5'-P-CCNC: 40 U/L (ref 1–41)
ANION GAP SERPL CALCULATED.3IONS-SCNC: 9.2 MMOL/L (ref 5–15)
AST SERPL-CCNC: 35 U/L (ref 1–40)
BASOPHILS # BLD AUTO: 0.06 10*3/MM3 (ref 0–0.2)
BASOPHILS NFR BLD AUTO: 0.7 % (ref 0–1.5)
BILIRUB SERPL-MCNC: 0.4 MG/DL (ref 0–1.2)
BUN SERPL-MCNC: 11 MG/DL (ref 6–20)
BUN/CREAT SERPL: 10.6 (ref 7–25)
CALCIUM SPEC-SCNC: 9.4 MG/DL (ref 8.6–10.5)
CHLORIDE SERPL-SCNC: 103 MMOL/L (ref 98–107)
CO2 SERPL-SCNC: 27.8 MMOL/L (ref 22–29)
CREAT SERPL-MCNC: 1.04 MG/DL (ref 0.76–1.27)
CRP SERPL-MCNC: 1.14 MG/DL (ref 0–0.5)
DEPRECATED RDW RBC AUTO: 51.1 FL (ref 37–54)
EGFRCR SERPLBLD CKD-EPI 2021: 83.7 ML/MIN/1.73
EOSINOPHIL # BLD AUTO: 0.4 10*3/MM3 (ref 0–0.4)
EOSINOPHIL NFR BLD AUTO: 4.8 % (ref 0.3–6.2)
ERYTHROCYTE [DISTWIDTH] IN BLOOD BY AUTOMATED COUNT: 14.4 % (ref 12.3–15.4)
ERYTHROCYTE [SEDIMENTATION RATE] IN BLOOD: 29 MM/HR (ref 0–20)
GLOBULIN UR ELPH-MCNC: 2.9 GM/DL
GLUCOSE SERPL-MCNC: 217 MG/DL (ref 65–99)
HCT VFR BLD AUTO: 43.9 % (ref 37.5–51)
HGB BLD-MCNC: 14.2 G/DL (ref 13–17.7)
IMM GRANULOCYTES # BLD AUTO: 0.08 10*3/MM3 (ref 0–0.05)
IMM GRANULOCYTES NFR BLD AUTO: 1 % (ref 0–0.5)
LYMPHOCYTES # BLD AUTO: 2.13 10*3/MM3 (ref 0.7–3.1)
LYMPHOCYTES NFR BLD AUTO: 25.3 % (ref 19.6–45.3)
MCH RBC QN AUTO: 31.6 PG (ref 26.6–33)
MCHC RBC AUTO-ENTMCNC: 32.3 G/DL (ref 31.5–35.7)
MCV RBC AUTO: 97.6 FL (ref 79–97)
MONOCYTES # BLD AUTO: 0.86 10*3/MM3 (ref 0.1–0.9)
MONOCYTES NFR BLD AUTO: 10.2 % (ref 5–12)
NEUTROPHILS NFR BLD AUTO: 4.89 10*3/MM3 (ref 1.7–7)
NEUTROPHILS NFR BLD AUTO: 58 % (ref 42.7–76)
NRBC BLD AUTO-RTO: 0 /100 WBC (ref 0–0.2)
PLATELET # BLD AUTO: 285 10*3/MM3 (ref 140–450)
PMV BLD AUTO: 10.8 FL (ref 6–12)
POTASSIUM SERPL-SCNC: 4.6 MMOL/L (ref 3.5–5.2)
PROT SERPL-MCNC: 6.7 G/DL (ref 6–8.5)
RBC # BLD AUTO: 4.5 10*6/MM3 (ref 4.14–5.8)
SODIUM SERPL-SCNC: 140 MMOL/L (ref 136–145)
WBC NRBC COR # BLD: 8.42 10*3/MM3 (ref 3.4–10.8)

## 2022-09-16 ENCOUNTER — TRANSCRIBE ORDERS (OUTPATIENT)
Dept: ADMINISTRATIVE | Facility: HOSPITAL | Age: 57
End: 2022-09-16

## 2022-09-16 DIAGNOSIS — E04.9 ENLARGEMENT OF THYROID: Primary | ICD-10-CM

## 2022-09-23 ENCOUNTER — OFFICE VISIT (OUTPATIENT)
Dept: ORTHOPEDIC SURGERY | Facility: CLINIC | Age: 57
End: 2022-09-23

## 2022-09-23 VITALS
WEIGHT: 315 LBS | HEIGHT: 74 IN | DIASTOLIC BLOOD PRESSURE: 74 MMHG | SYSTOLIC BLOOD PRESSURE: 115 MMHG | BODY MASS INDEX: 40.43 KG/M2

## 2022-09-23 DIAGNOSIS — M75.122 NONTRAUMATIC COMPLETE TEAR OF LEFT ROTATOR CUFF: Primary | ICD-10-CM

## 2022-09-23 DIAGNOSIS — M75.52 BURSITIS OF LEFT SHOULDER: ICD-10-CM

## 2022-09-23 DIAGNOSIS — M54.2 CERVICAL SPINE PAIN: ICD-10-CM

## 2022-09-23 DIAGNOSIS — M75.22 BICEPS TENDINITIS OF LEFT UPPER EXTREMITY: ICD-10-CM

## 2022-09-23 DIAGNOSIS — M75.42 IMPINGEMENT SYNDROME OF LEFT SHOULDER: ICD-10-CM

## 2022-09-23 DIAGNOSIS — M25.512 LEFT SHOULDER PAIN, UNSPECIFIED CHRONICITY: ICD-10-CM

## 2022-09-23 PROCEDURE — 99214 OFFICE O/P EST MOD 30 MIN: CPT | Performed by: ORTHOPAEDIC SURGERY

## 2022-09-23 RX ORDER — GABAPENTIN 800 MG/1
TABLET ORAL
COMMUNITY
Start: 2022-09-16

## 2022-09-23 RX ORDER — ETANERCEPT 50 MG/ML
SOLUTION SUBCUTANEOUS
COMMUNITY
Start: 2022-09-16 | End: 2022-09-23

## 2022-09-23 NOTE — PROGRESS NOTES
"                                                                    [facility] Orthopaedic Surgery Office Visit - Juan Weinberg MD    Office Visit       Patient Name: Jose R Hilliard    Chief Complaint:   Chief Complaint   Patient presents with   • Left Shoulder - Pain, Initial Evaluation       Referring Physician: ALISSA Henderson -- I appreciate the referral    History of Present Illness:   Jose R Hilliard is a 57 y.o. male who presents with left body part: shoulder Reason: pain.  Onset:Onset: atraumatic and gradual in nature. The issue has been ongoing for many year(s). Pain is a 3/10 on the pain scale. Pain is described as Pain Characterization: dull. Associated symptoms include Symptoms: popping, grinding and stiffness. The pain is worse with any movement of the joint; resting improve the pain. Previous treatments have included: NSAIDS, physical therapy, oral steroids, and steroid injection (last injection 2/11/22, 8/27/21). I have reviewed the patient's history of present illness as noted/entered above.    I have reviewed the patient's past medical history, surgical history, social history, family history, medications, and allergies as noted in the electronic medical record and as noted/entered.  I have reviewed the patient's review of systems as noted/enter and updated as noted in the patient's HPI.      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   All other systems reviewed and are negative.       Past Medical History:   Past " Medical History:   Diagnosis Date   • BPH (benign prostatic hypertrophy) with urinary obstruction    • Disease of thyroid gland    • Goiter    • History of hyperlipidemia    • History of hypertension    • History of osteoarthritis    • Hypogonadism in male    • Polycythemia        Past Surgical History:   Past Surgical History:   Procedure Laterality Date   • CHOLECYSTECTOMY         Family History:   Family History   Problem Relation Age of Onset   • No Known Problems Father    • No Known Problems Mother    • Stroke Paternal Grandfather    • Diabetes Paternal Grandfather        Social History:   Social History     Socioeconomic History   • Marital status:    Tobacco Use   • Smoking status: Never Smoker   • Smokeless tobacco: Never Used   Vaping Use   • Vaping Use: Never used   Substance and Sexual Activity   • Alcohol use: No   • Drug use: No   • Sexual activity: Defer       Medications:   Current Outpatient Medications:   •  aspirin ( ASPIRIN) 81 MG EC tablet, Take 1 tablet by mouth daily, Disp: 30 tablet, Rfl: 2  •  bisoprolol-hydrochlorothiazide (ZIAC) 5-6.25 MG per tablet, Take 1 tablet by mouth daily, Disp: 30 tablet, Rfl: 2  •  finasteride (PROSCAR) 5 MG tablet, , Disp: , Rfl:   •  folic acid (FOLVITE) 1 MG tablet, , Disp: , Rfl:   •  gabapentin (NEURONTIN) 600 MG tablet, , Disp: , Rfl:   •  gabapentin (NEURONTIN) 800 MG tablet, , Disp: , Rfl:   •  leflunomide (ARAVA) 20 MG tablet, Take 20 mg by mouth Daily., Disp: , Rfl:   •  levothyroxine (SYNTHROID, LEVOTHROID) 88 MCG tablet, Take 1 tablet by mouth daily (Patient taking differently: Take 50 mcg by mouth Daily.), Disp: 30 tablet, Rfl: 2  •  meloxicam (MOBIC) 7.5 MG tablet, Take 2 tablets by mouth Daily., Disp: 60 tablet, Rfl: 2    Allergies:   Allergies   Allergen Reactions   • Adhesive Tape Rash     Some tapes       The following portions of the patient's history were reviewed and updated as appropriate: allergies, current medications, past family  "history, past medical history, past social history, past surgical history and problem list.        Objective    Objective      Vital Signs:   Vitals:    09/23/22 0921   BP: 115/74   Weight: (!) 146 kg (321 lb 6.4 oz)   Height: 188 cm (74.02\")       Ortho Exam:  General: no acute distress, comfortable  Vitals reviewed in chart    Musculoskeletal Exam    SIDE: LEFT SHOULDER  Shoulder Exam:    Tenderness: rotator cuff -- mild    Range of motion measurements (degrees)  Forward flexion/Abduction/External rotation at side/ER at 90/IR at 90/IR position  Active: 150/150/45/80/80  Passive: same    EXCELLENT COMPENSATION    Painful arc of motion: yes  No evidence of septic joint  Pain with forward flexion and abduction greater: 120 degrees  Impingement testing Neer's test - positive/painful  Impingement testing Hawkin's test - positive/painful    Rotator Cuff Testing:  Tenderness to palpation at rotator cuff - mild  Rotator cuff testing Zandra's test - excellent deltoid compensation  Rotator cuff testing External rotation - weakness  Rotator cuff testing Lag signs - no  Rotator cuff testing Belly press - no  Pain with abduction great than 90 degrees - yes    Scapular dyskinesis - present, abnormal scapular motion    Long head of the biceps testing:  Mendez's test for biceps & Speed's test - mild  Bicipital groove tenderness to palpation/tenderness to palpation of biceps tendon - mild    Supraspinatus and infraspinatus fossa atrophy      Results Review:   Imaging Results (Last 24 Hours)     Procedure Component Value Units Date/Time    XR Shoulder 2+ View Left [[id number]] Resulted: 09/23/22 1001     Updated: 09/23/22 1002    Narrative:      Imaging: shoulder x-rays 2 views - AP and axillary x-ray views    Side: LEFT SHOULDER    Indication for shoulder x-ray 2 views: shoulder pain    Comparison: MRI comparison views available    Findings: No acute bony pathology.  There is evidence of superior humeral   head migration.  The " humeral head remains centered in the glenohumeral   joint. No evidence of calcific tendonitis.    No significant glenohumeral joint arthritic changes but superior migration   is noted.  Chronic massive tearing noted of the rotator cuff on his MRI.    I personally reviewed the above x-rays and discussed with the patient.            I personally reviewed his open [facility] [place] images from 7/29/2022 left shoulder known arthrogram.  Massive chronic retracted tears of the supra and infraspinatus significant fatty infiltration and muscle atrophy.  Superior migration of the humeral head and narrowing of the acromiohumeral index.  Perhaps small partial upper border tearing of the subscapularis long head of the biceps tendinosis.  Minimal glenohumeral joint arthritic changes noted.    Procedures             Assessment / Plan      Assessment/Plan:   Problem List Items Addressed This Visit        Musculoskeletal and Injuries    Nontraumatic complete tear of left rotator cuff - Primary    Impingement syndrome of left shoulder    Cervical spine pain    Biceps tendinitis of left upper extremity    Bursitis of left shoulder    Left shoulder pain    Relevant Orders    XR Shoulder 2+ View Left (Completed)          LEFT SHOULDER    I counseled on the following treatment options for massive chronic rotator cuff tears with excellent clinical compensation.    1. Nonoperative treatment  -- Reading protocol for deltoid strengthening discussed.  I believe this is his best option because of his excellent clinical compensation    2.  Rotator cuff repair-discussed this would be unpredictable and even with augmentation or biologics - healing can be unpredictable in this setting. Chronic massive tears left shoulder discussed arthroscopy is a reasonable option if his shoulder worsens over time.  He finds the current function quite acceptable at this time and understand that these tears may be irreparable.    3.  Superior capsular  reconstruction/SCR -discussed that this is sometimes an option in the chronic massive rotator cuff tear scenario however results are unpredictable with poor healing reported in the literature and unclear if patients with profound pseudoparalysis will reliably regain active range of motion.  Counseled the results for this are still early with human dermal allograft and unpredictable healing of the allograft in the setting.  Subacromial balloon spacer -- potential option for pain relief but early results to date.    4.  Muscle transfers-discussed that given profound loss of active range of motion this would not be a good option and can help with things like isolated loss of external rotation etc. but the patient has profound limitations and this would be unpredictable as well.    5.  Reverse shoulder arthroplasty -counseled that this is the most predictable option for pain relief and regaining active range of motion.  Loss of external rotation and diminished internal rotation can persist following this type of surgery and obviously this surgery is much more invasive than arthroscopic surgery.  It carries with it a separate set of operative and postoperative complications but may be the most predictable option.  He may eventually be a candidate for this in the future.        He would like to stick with conservative course at this time with excellent clinical compensation which I think is very reasonable.      Follow Up: he will keep me updated over the next 6-12 months pending progress        Juan Weinberg MD, FAAOS  Orthopedic Surgeon  Fellowship Trained Shoulder and Elbow Surgeon  [facility]  Orthopedics and Sports Medicine  [address] [place] [address], [address]  [place] [address]    09/23/22  10:18 EDT

## 2022-10-14 ENCOUNTER — HOSPITAL ENCOUNTER (OUTPATIENT)
Dept: ULTRASOUND IMAGING | Facility: HOSPITAL | Age: 57
Discharge: HOME | End: 2022-10-14
Admitting: FAMILY MEDICINE

## 2022-10-14 DIAGNOSIS — E04.9 ENLARGEMENT OF THYROID: ICD-10-CM

## 2022-10-14 PROCEDURE — 76536 US EXAM OF HEAD AND NECK: CPT | Performed by: RADIOLOGY

## 2022-10-14 PROCEDURE — 76536 US EXAM OF HEAD AND NECK: CPT

## 2022-12-30 ENCOUNTER — TELEPHONE (OUTPATIENT)
Dept: ORTHOPEDIC SURGERY | Facility: CLINIC | Age: 57
End: 2022-12-30

## 2022-12-30 NOTE — TELEPHONE ENCOUNTER
HUB AGENT ATTEMPTED NON-CLINICAL WARM TRANSFER - NO ANSWER     Caller: Jose R Hilliard    Relationship to patient: Self    Best call back number: [phone]    Type of visit: NEW PROBLEM / RIGHT KNEE / PAIN & SWELLING SINCE EARLY DEC 2022 / NO IMAGING YET / NO PRIOR RIGHT KNEE SURGERY - POSSIBLE ASPIRATION DUE TO FLUID ON RIGHT KNEE     Requested date: OFF WORK FRIDAYS, IF SHEY UNAVAILABLE THEN, WOULD NEED 48 HOURS NOTICE TO TAKE OFF WORK     Additional notes: 1ST AVAILABLE NEW -PROBLEM- HUB AGENT WOULD BE ABLE TO SCHEDULE FOR [facility] IS SHOWING AS 06-01-22 (EMAIL SENT TO NOTIFY HUB SUPERVISORS)     PLEASE CALL / LEAVE VMAIL TO DISCUSS SCHEDULING

## 2023-01-06 DIAGNOSIS — M25.561 RIGHT KNEE PAIN, UNSPECIFIED CHRONICITY: Primary | ICD-10-CM

## 2023-01-13 ENCOUNTER — OFFICE VISIT (OUTPATIENT)
Dept: ORTHOPEDIC SURGERY | Facility: CLINIC | Age: 58
End: 2023-01-13
Payer: COMMERCIAL

## 2023-01-13 ENCOUNTER — HOSPITAL ENCOUNTER (OUTPATIENT)
Dept: GENERAL RADIOLOGY | Facility: HOSPITAL | Age: 58
Discharge: HOME | End: 2023-01-13
Admitting: PHYSICIAN ASSISTANT
Payer: COMMERCIAL

## 2023-01-13 VITALS — HEIGHT: 74 IN | WEIGHT: 315 LBS | BODY MASS INDEX: 40.43 KG/M2

## 2023-01-13 DIAGNOSIS — M25.561 RIGHT KNEE PAIN, UNSPECIFIED CHRONICITY: ICD-10-CM

## 2023-01-13 DIAGNOSIS — M17.11 PRIMARY OSTEOARTHRITIS OF RIGHT KNEE: ICD-10-CM

## 2023-01-13 DIAGNOSIS — M25.561 RIGHT KNEE PAIN, UNSPECIFIED CHRONICITY: Primary | ICD-10-CM

## 2023-01-13 PROCEDURE — 73562 X-RAY EXAM OF KNEE 3: CPT | Performed by: RADIOLOGY

## 2023-01-13 PROCEDURE — 73562 X-RAY EXAM OF KNEE 3: CPT

## 2023-01-13 PROCEDURE — 20610 DRAIN/INJ JOINT/BURSA W/O US: CPT | Performed by: PHYSICIAN ASSISTANT

## 2023-01-13 PROCEDURE — 99213 OFFICE O/P EST LOW 20 MIN: CPT | Performed by: PHYSICIAN ASSISTANT

## 2023-01-13 RX ORDER — LIDOCAINE HYDROCHLORIDE 10 MG/ML
5 INJECTION, SOLUTION EPIDURAL; INFILTRATION; INTRACAUDAL; PERINEURAL
Status: COMPLETED | OUTPATIENT
Start: 2023-01-13 | End: 2023-01-13

## 2023-01-13 RX ORDER — SEMAGLUTIDE 1.34 MG/ML
INJECTION, SOLUTION SUBCUTANEOUS
COMMUNITY
Start: 2022-12-05

## 2023-01-13 RX ORDER — ALLOPURINOL 300 MG/1
TABLET ORAL
COMMUNITY
Start: 2022-12-02

## 2023-01-13 RX ORDER — ROSUVASTATIN CALCIUM 40 MG/1
TABLET, COATED ORAL
COMMUNITY
Start: 2022-12-29

## 2023-01-13 RX ORDER — HYDROCHLOROTHIAZIDE 12.5 MG/1
TABLET ORAL
COMMUNITY
Start: 2022-12-30

## 2023-01-13 RX ORDER — METHYLPREDNISOLONE ACETATE 80 MG/ML
80 INJECTION, SUSPENSION INTRA-ARTICULAR; INTRALESIONAL; INTRAMUSCULAR; SOFT TISSUE
Status: COMPLETED | OUTPATIENT
Start: 2023-01-13 | End: 2023-01-13

## 2023-01-13 RX ORDER — AMLODIPINE BESYLATE AND BENAZEPRIL HYDROCHLORIDE 10; 20 MG/1; MG/1
CAPSULE ORAL
COMMUNITY
Start: 2022-12-30

## 2023-01-13 RX ADMIN — METHYLPREDNISOLONE ACETATE 80 MG: 80 INJECTION, SUSPENSION INTRA-ARTICULAR; INTRALESIONAL; INTRAMUSCULAR; SOFT TISSUE at 09:32

## 2023-01-13 RX ADMIN — LIDOCAINE HYDROCHLORIDE 5 ML: 10 INJECTION, SOLUTION EPIDURAL; INFILTRATION; INTRACAUDAL; PERINEURAL at 09:32

## 2023-01-13 NOTE — PROGRESS NOTES
[facility] Orthopaedic Surgery Established Patient Note      Date:01/13/2023  Patient: Jose R Hilliard  YOB: 1965  PCP: Melissa Aragon APRN      Subjective     Chief Complaint:  Chief Complaint   Patient presents with   • Right Knee - Follow-up, Pain, Edema         History of Present Illness:     Joes R Hilliard is a 57 y.o. male presents today for follow up evaluation as result of right knee pain 1 month history with no specific injury.  Patient works a relatively heavy manual labor position and states that this seems to exacerbate his pain and symptoms.  He reports no specific injury.  He reports occasional swelling and stiffness and inability to fully extend.  He has difficulty with ambulation with pain along the medial aspect of the knee.  He reports popping with no catching or instability.  Denies any paresthesias.      Patient Active Problem List   Diagnosis   • Nontraumatic complete tear of left rotator cuff   • Impingement syndrome of left shoulder   • Cervical spine pain   • Biceps tendinitis of left upper extremity   • Bursitis of left shoulder   • Left shoulder pain     Past Medical History:   Diagnosis Date   • BPH (benign prostatic hypertrophy) with urinary obstruction    • Disease of thyroid gland    • Goiter    • History of hyperlipidemia    • History of hypertension    • History of osteoarthritis    • Hypogonadism in male    • Polycythemia    • Tendinitis of knee      Past Surgical History:   Procedure Laterality Date   • CHOLECYSTECTOMY       Family History   Problem Relation Age of Onset   • No Known Problems Father    • No Known Problems Mother    • Stroke Paternal Grandfather    • Diabetes Paternal Grandfather      Social History     Occupational History   • Not on file   Tobacco Use   • Smoking status: Never   • Smokeless tobacco: Never   Vaping Use   • Vaping Use: Never used   Substance and Sexual Activity   • Alcohol use: No   • Drug use: No   • Sexual activity: Defer     Jose R Hilliadr   "reports that he has never smoked. He has never used smokeless tobacco.. I have educated him on the risk of diseases from using tobacco products such as cancer, COPD and heart disease.       Current Outpatient Medications   Medication Sig Dispense Refill   • allopurinol (ZYLOPRIM) 300 MG tablet      • amLODIPine-benazepril (LOTREL) 10-20 MG per capsule      • aspirin (KP ASPIRIN) 81 MG EC tablet Take 1 tablet by mouth daily 30 tablet 2   • finasteride (PROSCAR) 5 MG tablet      • gabapentin (NEURONTIN) 800 MG tablet      • hydroCHLOROthiazide (HYDRODIURIL) 12.5 MG tablet      • leflunomide (ARAVA) 20 MG tablet Take 20 mg by mouth Daily.     • levothyroxine (SYNTHROID, LEVOTHROID) 88 MCG tablet Take 1 tablet by mouth daily (Patient taking differently: Take 50 mcg by mouth Daily.) 30 tablet 2   • meloxicam (MOBIC) 7.5 MG tablet Take 2 tablets by mouth Daily. 60 tablet 2   • Ozempic, 0.25 or 0.5 MG/DOSE, 2 MG/1.5ML solution pen-injector      • rosuvastatin (CRESTOR) 40 MG tablet        No current facility-administered medications for this visit.     Allergies   Allergen Reactions   • Adhesive Tape Rash     Some tapes       Review of Systems   Constitutional: Negative.   HENT: Negative.         Sinus pain   Eyes: Negative.    Cardiovascular: Negative.    Respiratory: Negative.    Endocrine: Negative.    Hematologic/Lymphatic: Negative.    Skin: Negative.    Musculoskeletal: Positive for back pain, joint pain, joint swelling and neck pain.        Pertinent positives listed in HPI   Gastrointestinal: Negative.    Genitourinary: Negative.    Neurological: Negative.    Psychiatric/Behavioral: Negative.    Allergic/Immunologic: Negative.    All other systems reviewed and are negative.        Objective      Vitals:    01/13/23 0846   Weight: (!) 146 kg (321 lb)   Height: 188 cm (74\")     Class 3 Severe Obesity (BMI >=40). Obesity-related health conditions include the following: listed in PMH. Obesity is unchanged. BMI is is " above average; BMI management plan is completed. We discussed portion control and increasing exercise.      Physical Exam  Vitals and nursing note reviewed.   Constitutional:       General: He is not in acute distress.     Appearance: Normal appearance. He is not ill-appearing.   HENT:      Head: Normocephalic and atraumatic.      Right Ear: External ear normal.      Left Ear: External ear normal.      Nose: Nose normal.      Mouth/Throat:      Mouth: Mucous membranes are moist.      Pharynx: Oropharynx is clear.   Eyes:      Extraocular Movements: Extraocular movements intact.      Conjunctiva/sclera: Conjunctivae normal.      Pupils: Pupils are equal, round, and reactive to light.   Cardiovascular:      Rate and Rhythm: Normal rate.      Pulses: Normal pulses.   Pulmonary:      Effort: Pulmonary effort is normal.   Abdominal:      General: There is no distension.   Musculoskeletal:      Cervical back: Normal range of motion. No rigidity.      Comments: Right knee on examination today reveals medial joint line tenderness with palpation.  Patient exhibits full flexion and extension with no pain instability upon varus/valgus stress.  Patient has positive medial Jacqueline's and Apley's grind test and pain to palpation posterior and anterior medial joint line.  Scant effusion.  Neurovascular status grossly intact right lower extremity.   Skin:     General: Skin is warm and dry.      Capillary Refill: Capillary refill takes less than 2 seconds.   Neurological:      General: No focal deficit present.      Mental Status: He is alert and oriented to person, place, and time.   Psychiatric:         Mood and Affect: Mood normal.         Behavior: Behavior normal.           Radiology:       XR Knee 3 View Right    Result Date: 1/13/2023    No acute findings in the right knee.  This report was finalized on 1/13/2023 8:49 AM by Dr. Dat Yoedr MD.              Assessment/Plan        ICD-10-CM ICD-9-CM   1. Right knee pain,  unspecified chronicity  M25.561 719.46   2. Primary osteoarthritis of right knee  M17.11 715.16         Plan/Discussion:    57-year-old male with relatively abrupt onset right knee osteoarthritis and pain with ambulation.  Further discussions have the patient and given the previous history of arthritic changes as well as his response to the steroid will medication in the past patient was provided today with intra-articular steroidal injection right knee 80 mg Depo-Medrol with lidocaine block into the intra-articular space of the right knee.  Patient tolerated this procedure well.  He was instructed to return back in 4 weeks to discuss the efficacy of conservative treatment.  The patient is felt to be a good candidate for viscosupplementation.  We will continue to treat conservatively.    Large Joint Arthrocentesis: R knee  Date/Time: 1/13/2023 9:32 AM  Consent given by: patient  Site marked: site marked  Supporting Documentation  Indications: pain and diagnostic evaluation   Procedure Details  Location: knee - R knee  Needle size: 25 G  Approach: anterolateral  Medications administered: 80 mg methylPREDNISolone acetate 80 MG/ML; 5 mL lidocaine PF 1% 1 %  Patient tolerance: patient tolerated the procedure well with no immediate complications                        This document was signed by Jay Roy PA-C January 13, 2023       CC: Melissa Aragon APRN    Dictated Utilizing Dragon Dictation     Please note that portions of this note were completed with a voice recognition program.     Part of this note may be an electronic transcription/translation of spoken language to printed text using the Dragon Dictation System.      Answers for HPI/ROS submitted by the patient on 1/6/2023  What is the primary reason for your visit?: Other  Please describe your symptoms.: Right knee problem  Have you had these symptoms before?: Yes  How long have you been having these symptoms?: Greater than 2 weeks  Please list any  medications you are currently taking for this condition.: Anti inflammatory  Please describe any probable cause for these symptoms. : N/a

## 2023-03-03 ENCOUNTER — OFFICE VISIT (OUTPATIENT)
Dept: ORTHOPEDIC SURGERY | Facility: CLINIC | Age: 58
End: 2023-03-03
Payer: COMMERCIAL

## 2023-03-03 VITALS — HEIGHT: 74 IN | WEIGHT: 315 LBS | BODY MASS INDEX: 40.43 KG/M2

## 2023-03-03 DIAGNOSIS — M25.561 RIGHT KNEE PAIN, UNSPECIFIED CHRONICITY: Primary | ICD-10-CM

## 2023-03-03 DIAGNOSIS — M17.11 PRIMARY OSTEOARTHRITIS OF RIGHT KNEE: ICD-10-CM

## 2023-03-03 PROCEDURE — 20610 DRAIN/INJ JOINT/BURSA W/O US: CPT | Performed by: PHYSICIAN ASSISTANT

## 2023-03-03 RX ORDER — LIDOCAINE HYDROCHLORIDE 10 MG/ML
5 INJECTION, SOLUTION EPIDURAL; INFILTRATION; INTRACAUDAL; PERINEURAL
Status: COMPLETED | OUTPATIENT
Start: 2023-03-03 | End: 2023-03-03

## 2023-03-03 RX ADMIN — LIDOCAINE HYDROCHLORIDE 5 ML: 10 INJECTION, SOLUTION EPIDURAL; INFILTRATION; INTRACAUDAL; PERINEURAL at 11:27

## 2023-03-03 NOTE — PROGRESS NOTES
[facility] Orthopaedic Surgery Established Patient Note      Date: 3/3/2023  Patient: Jose R Hilliard  YOB: 1965  PCP: Melissa Aragon APRN      Subjective     Chief Complaint:  Chief Complaint   Patient presents with   • Right Knee - Follow-up, Pain         History of Present Illness:     Jose R Hilliard is a 57 y.o. male presents today for follow up evaluation as result of right knee pain/osteoarthritis.  M patient has responded with the most recent intra-articular steroid injection with only mild return of pain symptoms.  He reports tightness and intermittent swelling.  This is typically alleviated with rest.  He presents today for further evaluation and discussion of potential for viscosupplementation as he is felt to be a fairly good candidate.  He reports dull throbbing aching sensation to the medial aspect of the knee with intermittent tightness.  He denies any other new complaints.  Denies any paresthesias.       Patient Active Problem List   Diagnosis   • Nontraumatic complete tear of left rotator cuff   • Impingement syndrome of left shoulder   • Cervical spine pain   • Biceps tendinitis of left upper extremity   • Bursitis of left shoulder   • Left shoulder pain     Past Medical History:   Diagnosis Date   • BPH (benign prostatic hypertrophy) with urinary obstruction    • Disease of thyroid gland    • Goiter    • History of hyperlipidemia    • History of hypertension    • History of osteoarthritis    • Hypogonadism in male    • Polycythemia    • Tendinitis of knee      Past Surgical History:   Procedure Laterality Date   • CHOLECYSTECTOMY       Family History   Problem Relation Age of Onset   • No Known Problems Father    • No Known Problems Mother    • Stroke Paternal Grandfather    • Diabetes Paternal Grandfather      Social History     Occupational History   • Not on file   Tobacco Use   • Smoking status: Never   • Smokeless tobacco: Never   Vaping Use   • Vaping Use: Never used   Substance and  "Sexual Activity   • Alcohol use: No   • Drug use: No   • Sexual activity: Defer     Jose R Hilliard  reports that he has never smoked. He has never used smokeless tobacco.. I have educated him on the risk of diseases from using tobacco products such as cancer, COPD and heart disease.       Current Outpatient Medications   Medication Sig Dispense Refill   • allopurinol (ZYLOPRIM) 300 MG tablet      • amLODIPine-benazepril (LOTREL) 10-20 MG per capsule      • aspirin (KP ASPIRIN) 81 MG EC tablet Take 1 tablet by mouth daily 30 tablet 2   • finasteride (PROSCAR) 5 MG tablet      • gabapentin (NEURONTIN) 800 MG tablet      • hydroCHLOROthiazide (HYDRODIURIL) 12.5 MG tablet      • leflunomide (ARAVA) 20 MG tablet Take 1 tablet by mouth Daily.     • levothyroxine (SYNTHROID, LEVOTHROID) 88 MCG tablet Take 1 tablet by mouth daily (Patient taking differently: Take 50 mcg by mouth Daily.) 30 tablet 2   • meloxicam (MOBIC) 7.5 MG tablet Take 2 tablets by mouth Daily. 60 tablet 2   • Ozempic, 0.25 or 0.5 MG/DOSE, 2 MG/1.5ML solution pen-injector      • rosuvastatin (CRESTOR) 40 MG tablet        No current facility-administered medications for this visit.     Allergies   Allergen Reactions   • Adhesive Tape Rash     Some tapes       Review of Systems   Constitutional: Negative.   HENT: Negative.         Sinus pain   Eyes: Negative.    Cardiovascular: Negative.    Respiratory: Negative.    Endocrine: Negative.    Hematologic/Lymphatic: Negative.    Skin: Negative.    Musculoskeletal: Positive for back pain, joint pain, joint swelling and neck pain.        Pertinent positives listed in HPI   Gastrointestinal: Negative.    Genitourinary: Negative.    Neurological: Negative.    Psychiatric/Behavioral: Negative.    Allergic/Immunologic: Negative.    All other systems reviewed and are negative.        Objective      Vitals:    03/03/23 0921   Weight: (!) 146 kg (321 lb)   Height: 188 cm (74\")     Class 3 Severe Obesity (BMI >=40). " Obesity-related health conditions include the following: listed in PMH. Obesity is unchanged. BMI is is above average; BMI management plan is completed. We discussed portion control and increasing exercise.      Physical Exam  Vitals and nursing note reviewed.   Constitutional:       General: He is not in acute distress.     Appearance: Normal appearance. He is not ill-appearing.   HENT:      Head: Normocephalic and atraumatic.      Right Ear: External ear normal.      Left Ear: External ear normal.      Nose: Nose normal.      Mouth/Throat:      Mouth: Mucous membranes are moist.      Pharynx: Oropharynx is clear.   Eyes:      Extraocular Movements: Extraocular movements intact.      Conjunctiva/sclera: Conjunctivae normal.      Pupils: Pupils are equal, round, and reactive to light.   Cardiovascular:      Rate and Rhythm: Normal rate.      Pulses: Normal pulses.   Pulmonary:      Effort: Pulmonary effort is normal.   Abdominal:      General: There is no distension.   Musculoskeletal:      Cervical back: Normal range of motion. No rigidity.      Comments: Right knee on examination today reveals medial joint line tenderness with palpation.  Patient exhibits full flexion and extension with no pain instability upon varus/valgus stress.  Patient has equivocal medial Jacqueline's and Apley's grind test and pain to palpation posterior and anterior medial joint line.  Scant effusion.  Neurovascular status grossly intact right lower extremity.   Skin:     General: Skin is warm and dry.      Capillary Refill: Capillary refill takes less than 2 seconds.   Neurological:      General: No focal deficit present.      Mental Status: He is alert and oriented to person, place, and time.   Psychiatric:         Mood and Affect: Mood normal.         Behavior: Behavior normal.           Radiology:       XR Knee 3 View Right    Result Date: 1/13/2023    No acute findings in the right knee.  This report was finalized on 1/13/2023 8:49 AM  by Dr. Dat Yoder MD.              Assessment/Plan        ICD-10-CM ICD-9-CM   1. Right knee pain, unspecified chronicity  M25.561 719.46   2. Primary osteoarthritis of right knee  M17.11 715.16         Plan/Discussion:    57-year-old male with notable right knee osteoarthritis.  The patient responded with the previous intra-articular steroid injection at last office visit.  He is still continue to see some improvement however there has been mild return of intermittent swelling.  As result of the patient's efficacy and improvement following the most recent injection he is felt to be a good candidate for viscosupplementation.  This patient has been authorized inpatient did undergo infusion today 48 mg Synvisc 1 with lidocaine block into the intra-articular space of the right knee.  Patient tolerated this procedure well.  He was instructed to return back in 4 weeks for further evaluation of the efficacy of conservative treatment.    Large Joint Arthrocentesis: R knee  Date/Time: 3/3/2023 11:27 AM  Consent given by: patient  Site marked: site marked  Timeout: Immediately prior to procedure a time out was called to verify the correct patient, procedure, equipment, support staff and site/side marked as required   Supporting Documentation  Indications: pain and diagnostic evaluation   Procedure Details  Location: knee - R knee  Needle size: 25 G  Approach: lateral  Medications administered: 5 mL lidocaine PF 1% 1 %; 48 mg hylan 48 MG/6ML  Patient tolerance: patient tolerated the procedure well with no immediate complications                        This document was signed by Jay Roy PA-C March 3, 2023       CC: Melissa Aragon APRN    Dictated Utilizing Dragon Dictation     Please note that portions of this note were completed with a voice recognition program.     Part of this note may be an electronic transcription/translation of spoken language to printed text using the Dragon Dictation System.      Answers  for HPI/ROS submitted by the patient on 1/6/2023  What is the primary reason for your visit?: Other  Please describe your symptoms.: Right knee problem  Have you had these symptoms before?: Yes  How long have you been having these symptoms?: Greater than 2 weeks  Please list any medications you are currently taking for this condition.: Anti inflammatory  Please describe any probable cause for these symptoms. : N/a

## 2023-05-19 ENCOUNTER — OFFICE VISIT (OUTPATIENT)
Dept: ORTHOPEDIC SURGERY | Facility: CLINIC | Age: 58
End: 2023-05-19
Payer: COMMERCIAL

## 2023-05-19 VITALS — HEIGHT: 74 IN | WEIGHT: 315 LBS | BODY MASS INDEX: 40.43 KG/M2

## 2023-05-19 DIAGNOSIS — M17.11 PRIMARY OSTEOARTHRITIS OF RIGHT KNEE: Primary | ICD-10-CM

## 2023-05-19 DIAGNOSIS — M25.561 RIGHT KNEE PAIN, UNSPECIFIED CHRONICITY: ICD-10-CM

## 2023-05-19 RX ORDER — PRAMIPEXOLE DIHYDROCHLORIDE 0.12 MG/1
TABLET ORAL
COMMUNITY
Start: 2023-05-12

## 2023-05-19 RX ORDER — BENAZEPRIL HYDROCHLORIDE 10 MG/1
1 TABLET ORAL DAILY
COMMUNITY
Start: 2023-04-14

## 2023-05-19 RX ORDER — AMLODIPINE BESYLATE 2.5 MG/1
1 TABLET ORAL DAILY
COMMUNITY
Start: 2023-04-14

## 2023-05-19 RX ADMIN — LIDOCAINE HYDROCHLORIDE 5 ML: 10 INJECTION, SOLUTION EPIDURAL; INFILTRATION; INTRACAUDAL; PERINEURAL at 10:45

## 2023-05-19 RX ADMIN — METHYLPREDNISOLONE ACETATE 80 MG: 80 INJECTION, SUSPENSION INTRA-ARTICULAR; INTRALESIONAL; INTRAMUSCULAR; SOFT TISSUE at 10:45

## 2023-05-19 NOTE — PROGRESS NOTES
[facility] Orthopaedic Surgery Established Patient Note      Date: 5/19/2023  Patient: Jose R Hilliard  YOB: 1965  PCP: Melissa Aragon APRN      Subjective     Chief Complaint:  Chief Complaint   Patient presents with    Right Knee - Follow-up, Pain         History of Present Illness:     Jose R Hilliard is a 57 y.o. male presents today for follow up evaluation as result of right knee pain/osteoarthritis.  Patient has responded in the past with intra-articular steroid injections as well as last office visit Synvis 1 3/3/2023.  He has seen some improvement however he is having some return of pain symptoms.  Patient also has ongoing difficulty upon repetitive motion and certain activities describes instability of the knee with twisting.  No other new complaints.  Denies any paresthesias.        Patient Active Problem List   Diagnosis    Nontraumatic complete tear of left rotator cuff    Impingement syndrome of left shoulder    Cervical spine pain    Biceps tendinitis of left upper extremity    Bursitis of left shoulder    Left shoulder pain     Past Medical History:   Diagnosis Date    BPH (benign prostatic hypertrophy) with urinary obstruction     Disease of thyroid gland     Goiter     History of hyperlipidemia     History of hypertension     History of osteoarthritis     Hypogonadism in male     Polycythemia     Tendinitis of knee      Past Surgical History:   Procedure Laterality Date    CHOLECYSTECTOMY       Family History   Problem Relation Age of Onset    No Known Problems Father     No Known Problems Mother     Stroke Paternal Grandfather     Diabetes Paternal Grandfather      Social History     Occupational History    Not on file   Tobacco Use    Smoking status: Never    Smokeless tobacco: Never   Vaping Use    Vaping Use: Never used   Substance and Sexual Activity    Alcohol use: No    Drug use: No    Sexual activity: Defer     Jose R Hilliard  reports that he has never smoked. He has never used smokeless  tobacco.. I have educated him on the risk of diseases from using tobacco products such as cancer, COPD and heart disease.       Current Outpatient Medications   Medication Sig Dispense Refill    allopurinol (ZYLOPRIM) 300 MG tablet       amLODIPine (NORVASC) 2.5 MG tablet Take 1 tablet by mouth Daily.      aspirin ( ASPIRIN) 81 MG EC tablet Take 1 tablet by mouth daily 30 tablet 2    benazepril (LOTENSIN) 10 MG tablet Take 1 tablet by mouth Daily.      finasteride (PROSCAR) 5 MG tablet       gabapentin (NEURONTIN) 800 MG tablet       hydroCHLOROthiazide (HYDRODIURIL) 12.5 MG tablet       leflunomide (ARAVA) 20 MG tablet Take 1 tablet by mouth Daily.      levothyroxine (SYNTHROID, LEVOTHROID) 88 MCG tablet Take 1 tablet by mouth daily (Patient taking differently: Take 50 mcg by mouth Daily.) 30 tablet 2    meloxicam (MOBIC) 7.5 MG tablet Take 2 tablets by mouth Daily. 60 tablet 2    Ozempic, 0.25 or 0.5 MG/DOSE, 2 MG/1.5ML solution pen-injector       pramipexole (MIRAPEX) 0.125 MG tablet TAKE ONE Tablet BY MOUTH EACH NIGHT AT BEDTIME AS NEEDED FOR restless leg      rosuvastatin (CRESTOR) 40 MG tablet  (Patient not taking: Reported on 5/19/2023)       No current facility-administered medications for this visit.     Allergies   Allergen Reactions    Adhesive Tape Rash     Some tapes       Review of Systems   Constitutional: Negative.   HENT: Negative.          Sinus pain   Eyes: Negative.    Cardiovascular: Negative.    Respiratory: Negative.     Endocrine: Negative.    Hematologic/Lymphatic: Negative.    Skin: Negative.    Musculoskeletal:  Positive for back pain, joint pain, joint swelling and neck pain.        Pertinent positives listed in HPI   Gastrointestinal: Negative.    Genitourinary: Negative.    Neurological: Negative.    Psychiatric/Behavioral: Negative.     Allergic/Immunologic: Negative.    All other systems reviewed and are negative.      Objective      Vitals:    05/19/23 1009   Weight: (!) 146 kg  "(321 lb)   Height: 188 cm (74\")     Class 3 Severe Obesity (BMI >=40). Obesity-related health conditions include the following:  listed in PMH . Obesity is unchanged. BMI is is above average; BMI management plan is completed. We discussed portion control and increasing exercise.      Physical Exam  Vitals and nursing note reviewed.   Constitutional:       General: He is not in acute distress.     Appearance: Normal appearance. He is not ill-appearing.   HENT:      Head: Normocephalic and atraumatic.      Right Ear: External ear normal.      Left Ear: External ear normal.      Nose: Nose normal.      Mouth/Throat:      Mouth: Mucous membranes are moist.      Pharynx: Oropharynx is clear.   Eyes:      Extraocular Movements: Extraocular movements intact.      Conjunctiva/sclera: Conjunctivae normal.      Pupils: Pupils are equal, round, and reactive to light.   Cardiovascular:      Rate and Rhythm: Normal rate.      Pulses: Normal pulses.   Pulmonary:      Effort: Pulmonary effort is normal.   Abdominal:      General: There is no distension.   Musculoskeletal:      Cervical back: Normal range of motion. No rigidity.      Comments: Right knee on examination today reveals medial joint line tenderness with palpation.  Patient exhibits full flexion and extension with no pain instability upon varus/valgus stress.  Patient has equivocal medial Jacqueline's and Apley's grind test and pain to palpation posterior and anterior medial joint line. Valgus instability with stress.  Varus alignment Scant effusion.  Neurovascular status grossly intact right lower extremity.   Skin:     General: Skin is warm and dry.      Capillary Refill: Capillary refill takes less than 2 seconds.   Neurological:      General: No focal deficit present.      Mental Status: He is alert and oriented to person, place, and time.   Psychiatric:         Mood and Affect: Mood normal.         Behavior: Behavior normal.         Radiology:       US CAROTID BLOOD " FLOW BILATERAL    Result Date: 3/24/2023  Less than 50% bilateral carotid artery stenosis. Bilateral patent vertebral arteries. Images reviewed, interpreted, and dictated by Dr. Amor Black. Transcribed by HAJA Eli(R).    US thyroid    Result Date: 3/24/2023  Enlarged heterogeneous thyroid without dominant nodule. TI-RADS Category 3: Mildly suspicious. TI-RADS Category 4: Moderately suspicious. Images reviewed, interpreted, and dictated by DAGMAR Philip MD             Assessment/Plan        ICD-10-CM ICD-9-CM   1. Primary osteoarthritis of right knee  M17.11 715.16   2. Right knee pain, unspecified chronicity  M25.561 719.46         Plan/Discussion:    57-year-old male with notable right knee osteoarthritis.  The patient has responded in the past to previous intra-articular steroid injections as well as minimal alleviation with the Synvisc 1 previously.  Patient continues to report pain symptoms associated with the osteoarthritis.  The patient continues to have difficulty with valgus instability stress.  As result of medial joint space narrowing.  Today the patient was provided with a measurement for right medial  brace and was provided with intra-articular steroid injection 80 mg Depo-Medrol with lidocaine block injected into the intra-articular space of the right knee.  Patient tolerated this procedure well.  Patient was instructed to return back 4 weeks for further evaluation of the efficacy of the conservative treatment.    Large Joint Arthrocentesis: R knee  Date/Time: 5/19/2023 10:45 AM  Consent given by: patient  Site marked: site marked  Supporting Documentation  Indications: pain and diagnostic evaluation   Procedure Details  Location: knee - R knee  Needle size: 25 G  Approach: anterolateral  Medications administered: 80 mg methylPREDNISolone acetate 80 MG/ML; 5 mL lidocaine PF 1% 1 %  Patient tolerance: patient tolerated the procedure well with no immediate  complications                      This document was signed by Jay Roy PA-C May 19, 2023      CC: Melissa Aragon APRN    Dictated Utilizing Dragon Dictation     Please note that portions of this note were completed with a voice recognition program.     Part of this note may be an electronic transcription/translation of spoken language to printed text using the Dragon Dictation System.

## 2023-06-05 RX ORDER — LIDOCAINE HYDROCHLORIDE 10 MG/ML
5 INJECTION, SOLUTION EPIDURAL; INFILTRATION; INTRACAUDAL; PERINEURAL
Status: COMPLETED | OUTPATIENT
Start: 2023-05-19 | End: 2023-05-19

## 2023-06-05 RX ORDER — METHYLPREDNISOLONE ACETATE 80 MG/ML
80 INJECTION, SUSPENSION INTRA-ARTICULAR; INTRALESIONAL; INTRAMUSCULAR; SOFT TISSUE
Status: COMPLETED | OUTPATIENT
Start: 2023-05-19 | End: 2023-05-19

## 2023-10-06 ENCOUNTER — TRANSCRIBE ORDERS (OUTPATIENT)
Dept: ADMINISTRATIVE | Facility: HOSPITAL | Age: 58
End: 2023-10-06
Payer: COMMERCIAL

## 2023-10-06 ENCOUNTER — HOSPITAL ENCOUNTER (OUTPATIENT)
Dept: GENERAL RADIOLOGY | Facility: HOSPITAL | Age: 58
Discharge: HOME | End: 2023-10-06
Admitting: NURSE PRACTITIONER
Payer: COMMERCIAL

## 2023-10-06 DIAGNOSIS — M54.2 CERVICALGIA: ICD-10-CM

## 2023-10-06 DIAGNOSIS — M54.50 LOW BACK PAIN, UNSPECIFIED BACK PAIN LATERALITY, UNSPECIFIED CHRONICITY, UNSPECIFIED WHETHER SCIATICA PRESENT: Primary | ICD-10-CM

## 2023-10-06 DIAGNOSIS — M54.50 LOW BACK PAIN, UNSPECIFIED BACK PAIN LATERALITY, UNSPECIFIED CHRONICITY, UNSPECIFIED WHETHER SCIATICA PRESENT: ICD-10-CM

## 2023-10-06 PROCEDURE — 72072 X-RAY EXAM THORAC SPINE 3VWS: CPT

## 2023-10-06 PROCEDURE — 72040 X-RAY EXAM NECK SPINE 2-3 VW: CPT

## 2023-10-06 PROCEDURE — 72100 X-RAY EXAM L-S SPINE 2/3 VWS: CPT

## 2023-11-17 ENCOUNTER — INFUSION (OUTPATIENT)
Dept: ONCOLOGY | Facility: HOSPITAL | Age: 58
End: 2023-11-17
Payer: COMMERCIAL

## 2023-11-17 VITALS
RESPIRATION RATE: 18 BRPM | OXYGEN SATURATION: 94 % | HEART RATE: 85 BPM | SYSTOLIC BLOOD PRESSURE: 144 MMHG | DIASTOLIC BLOOD PRESSURE: 80 MMHG | TEMPERATURE: 97.8 F

## 2023-11-17 DIAGNOSIS — D75.1 POLYCYTHEMIA: Primary | ICD-10-CM

## 2023-11-17 LAB
HCT VFR BLD AUTO: 56.8 % (ref 37.5–51)
HGB BLD-MCNC: 18 G/DL (ref 13–17.7)
Lab: NORMAL
POST-BLOOD PRESSURE: NORMAL MMHG
PRE-BLOOD PRESSURE: NORMAL MMHG
PRE-HCT: 56.8 %
PRE-HGB: 18 G/DL
PRE-PULSE: 85 BPM
VOLUME COLLECTED: 500 ML

## 2023-11-17 PROCEDURE — 99195 PHLEBOTOMY: CPT

## 2023-11-17 PROCEDURE — 85014 HEMATOCRIT: CPT

## 2023-11-17 PROCEDURE — 85018 HEMOGLOBIN: CPT

## 2023-11-17 NOTE — CODE DOCUMENTATION
11- 1315  Phlebotomy performed by lab staff. 500ml blood drawn. Pt yee. well. Post bp 111/80 and heart rate 88. Patient drinking gatorade and eating peanut butter crackers. Patient denies any problems.

## 2023-12-01 ENCOUNTER — INFUSION (OUTPATIENT)
Dept: ONCOLOGY | Facility: HOSPITAL | Age: 58
End: 2023-12-01
Payer: COMMERCIAL

## 2023-12-01 DIAGNOSIS — D75.1 POLYCYTHEMIA: Primary | ICD-10-CM

## 2023-12-01 LAB
BASOPHILS # BLD AUTO: 0.08 10*3/MM3 (ref 0–0.2)
BASOPHILS NFR BLD AUTO: 0.8 % (ref 0–1.5)
DEPRECATED RDW RBC AUTO: 47.9 FL (ref 37–54)
EOSINOPHIL # BLD AUTO: 0.25 10*3/MM3 (ref 0–0.4)
EOSINOPHIL NFR BLD AUTO: 2.4 % (ref 0.3–6.2)
ERYTHROCYTE [DISTWIDTH] IN BLOOD BY AUTOMATED COUNT: 13.8 % (ref 12.3–15.4)
HCT VFR BLD AUTO: 55.6 % (ref 37.5–51)
HGB BLD-MCNC: 17.6 G/DL (ref 13–17.7)
IMM GRANULOCYTES # BLD AUTO: 0.07 10*3/MM3 (ref 0–0.05)
IMM GRANULOCYTES NFR BLD AUTO: 0.7 % (ref 0–0.5)
LYMPHOCYTES # BLD AUTO: 2.32 10*3/MM3 (ref 0.7–3.1)
LYMPHOCYTES NFR BLD AUTO: 21.9 % (ref 19.6–45.3)
Lab: NORMAL
MCH RBC QN AUTO: 29.9 PG (ref 26.6–33)
MCHC RBC AUTO-ENTMCNC: 31.7 G/DL (ref 31.5–35.7)
MCV RBC AUTO: 94.4 FL (ref 79–97)
MONOCYTES # BLD AUTO: 0.9 10*3/MM3 (ref 0.1–0.9)
MONOCYTES NFR BLD AUTO: 8.5 % (ref 5–12)
NEUTROPHILS NFR BLD AUTO: 6.97 10*3/MM3 (ref 1.7–7)
NEUTROPHILS NFR BLD AUTO: 65.7 % (ref 42.7–76)
NRBC BLD AUTO-RTO: 0 /100 WBC (ref 0–0.2)
PLATELET # BLD AUTO: 294 10*3/MM3 (ref 140–450)
PMV BLD AUTO: 9.4 FL (ref 6–12)
POST-BLOOD PRESSURE: NORMAL MMHG
PRE-BLOOD PRESSURE: NORMAL MMHG
PRE-HCT: 55.6 %
PRE-HGB: 17.6 G/DL
PRE-PULSE: 87 BPM
RBC # BLD AUTO: 5.89 10*6/MM3 (ref 4.14–5.8)
VOLUME COLLECTED: 496 ML
WBC NRBC COR # BLD AUTO: 10.59 10*3/MM3 (ref 3.4–10.8)

## 2023-12-01 PROCEDURE — 99195 PHLEBOTOMY: CPT

## 2023-12-01 PROCEDURE — 85025 COMPLETE CBC W/AUTO DIFF WBC: CPT

## 2023-12-01 NOTE — PROGRESS NOTES
Pre-phlebotomy /80 and HR 85. Phlebotomy performed by phlebotomist and 496mL removed.   1345 Post phlebotomy /71 and HR 90. Pt denies any nausea, dizziness or lightheadedness. Pt ambulatory from department with MARILU

## 2023-12-15 ENCOUNTER — INFUSION (OUTPATIENT)
Dept: ONCOLOGY | Facility: HOSPITAL | Age: 58
End: 2023-12-15
Payer: COMMERCIAL

## 2023-12-15 VITALS
OXYGEN SATURATION: 97 % | DIASTOLIC BLOOD PRESSURE: 78 MMHG | SYSTOLIC BLOOD PRESSURE: 123 MMHG | HEART RATE: 80 BPM | RESPIRATION RATE: 18 BRPM | TEMPERATURE: 98 F

## 2023-12-15 DIAGNOSIS — D75.1 POLYCYTHEMIA: ICD-10-CM

## 2023-12-15 LAB
BASOPHILS # BLD AUTO: 0.08 10*3/MM3 (ref 0–0.2)
BASOPHILS NFR BLD AUTO: 1 % (ref 0–1.5)
DEPRECATED RDW RBC AUTO: 47.8 FL (ref 37–54)
EOSINOPHIL # BLD AUTO: 0.23 10*3/MM3 (ref 0–0.4)
EOSINOPHIL NFR BLD AUTO: 2.9 % (ref 0.3–6.2)
ERYTHROCYTE [DISTWIDTH] IN BLOOD BY AUTOMATED COUNT: 13.8 % (ref 12.3–15.4)
HCT VFR BLD AUTO: 55.3 % (ref 37.5–51)
HGB BLD-MCNC: 17.5 G/DL (ref 13–17.7)
IMM GRANULOCYTES # BLD AUTO: 0.04 10*3/MM3 (ref 0–0.05)
IMM GRANULOCYTES NFR BLD AUTO: 0.5 % (ref 0–0.5)
LYMPHOCYTES # BLD AUTO: 2.01 10*3/MM3 (ref 0.7–3.1)
LYMPHOCYTES NFR BLD AUTO: 25.5 % (ref 19.6–45.3)
Lab: NORMAL
MCH RBC QN AUTO: 29.5 PG (ref 26.6–33)
MCHC RBC AUTO-ENTMCNC: 31.6 G/DL (ref 31.5–35.7)
MCV RBC AUTO: 93.3 FL (ref 79–97)
MONOCYTES # BLD AUTO: 0.93 10*3/MM3 (ref 0.1–0.9)
MONOCYTES NFR BLD AUTO: 11.8 % (ref 5–12)
NEUTROPHILS NFR BLD AUTO: 4.58 10*3/MM3 (ref 1.7–7)
NEUTROPHILS NFR BLD AUTO: 58.3 % (ref 42.7–76)
NRBC BLD AUTO-RTO: 0 /100 WBC (ref 0–0.2)
PLATELET # BLD AUTO: 277 10*3/MM3 (ref 140–450)
PMV BLD AUTO: 9.5 FL (ref 6–12)
POST-BLOOD PRESSURE: NORMAL MMHG
PRE-BLOOD PRESSURE: NORMAL MMHG
PRE-HCT: 55.3 %
PRE-HGB: 17.5 G/DL
PRE-PULSE: 80 BPM
RBC # BLD AUTO: 5.93 10*6/MM3 (ref 4.14–5.8)
VOLUME COLLECTED: 498 ML
WBC NRBC COR # BLD AUTO: 7.87 10*3/MM3 (ref 3.4–10.8)

## 2023-12-15 PROCEDURE — 85025 COMPLETE CBC W/AUTO DIFF WBC: CPT

## 2023-12-15 PROCEDURE — 99195 PHLEBOTOMY: CPT

## 2023-12-15 NOTE — PROGRESS NOTES
Pre phlebotomy /78 and HR 80. 498mL removed by phlebotomist. Post phlebotomy /79 and HR 83. Pt denies any nausea, lightheadedness or dizziness. Pt ambulatory for department with MARILU

## 2023-12-29 ENCOUNTER — INFUSION (OUTPATIENT)
Dept: ONCOLOGY | Facility: HOSPITAL | Age: 58
End: 2023-12-29
Payer: COMMERCIAL

## 2023-12-29 VITALS
RESPIRATION RATE: 18 BRPM | SYSTOLIC BLOOD PRESSURE: 137 MMHG | TEMPERATURE: 97.5 F | DIASTOLIC BLOOD PRESSURE: 82 MMHG | OXYGEN SATURATION: 98 % | HEART RATE: 82 BPM

## 2023-12-29 DIAGNOSIS — D75.1 POLYCYTHEMIA: ICD-10-CM

## 2023-12-29 LAB
BASOPHILS # BLD AUTO: 0.09 10*3/MM3 (ref 0–0.2)
BASOPHILS NFR BLD AUTO: 0.9 % (ref 0–1.5)
DEPRECATED RDW RBC AUTO: 46.3 FL (ref 37–54)
EOSINOPHIL # BLD AUTO: 0.18 10*3/MM3 (ref 0–0.4)
EOSINOPHIL NFR BLD AUTO: 1.7 % (ref 0.3–6.2)
ERYTHROCYTE [DISTWIDTH] IN BLOOD BY AUTOMATED COUNT: 13.7 % (ref 12.3–15.4)
HCT VFR BLD AUTO: 50.4 % (ref 37.5–51)
HGB BLD-MCNC: 16 G/DL (ref 13–17.7)
IMM GRANULOCYTES # BLD AUTO: 0.05 10*3/MM3 (ref 0–0.05)
IMM GRANULOCYTES NFR BLD AUTO: 0.5 % (ref 0–0.5)
LYMPHOCYTES # BLD AUTO: 2.85 10*3/MM3 (ref 0.7–3.1)
LYMPHOCYTES NFR BLD AUTO: 27.6 % (ref 19.6–45.3)
Lab: NORMAL
MCH RBC QN AUTO: 29 PG (ref 26.6–33)
MCHC RBC AUTO-ENTMCNC: 31.7 G/DL (ref 31.5–35.7)
MCV RBC AUTO: 91.3 FL (ref 79–97)
MONOCYTES # BLD AUTO: 1.2 10*3/MM3 (ref 0.1–0.9)
MONOCYTES NFR BLD AUTO: 11.6 % (ref 5–12)
NEUTROPHILS NFR BLD AUTO: 5.96 10*3/MM3 (ref 1.7–7)
NEUTROPHILS NFR BLD AUTO: 57.7 % (ref 42.7–76)
NRBC BLD AUTO-RTO: 0 /100 WBC (ref 0–0.2)
PLATELET # BLD AUTO: 305 10*3/MM3 (ref 140–450)
PMV BLD AUTO: 9.6 FL (ref 6–12)
POST-BLOOD PRESSURE: NORMAL MMHG
PRE-BLOOD PRESSURE: NORMAL MMHG
PRE-HCT: 50.4 %
PRE-HGB: 16 G/DL
PRE-PULSE: 77 BPM
RBC # BLD AUTO: 5.52 10*6/MM3 (ref 4.14–5.8)
VOLUME COLLECTED: 505 ML
WBC NRBC COR # BLD AUTO: 10.33 10*3/MM3 (ref 3.4–10.8)

## 2023-12-29 PROCEDURE — 85025 COMPLETE CBC W/AUTO DIFF WBC: CPT

## 2023-12-29 PROCEDURE — 99195 PHLEBOTOMY: CPT

## 2023-12-29 NOTE — PROGRESS NOTES
Patient arrived to clinic for therapeutic phlebotomy. Patient given a snack and drink prior to procedure. Patient reports having phlebotomy in the past and doing well with them. Pre-phlebotomy VSS. Therapeutic phlebotomy completed per laboratory technician and 505 ml removed. Post phlebotomy VSS. Patient tolerated well and denies any issues, and no acute distress is noted at this time. Patient ambulated independently out of clinic upon discharge.

## 2024-01-12 ENCOUNTER — INFUSION (OUTPATIENT)
Dept: ONCOLOGY | Facility: HOSPITAL | Age: 59
End: 2024-01-12
Payer: COMMERCIAL

## 2024-01-12 VITALS
HEART RATE: 65 BPM | TEMPERATURE: 97.3 F | DIASTOLIC BLOOD PRESSURE: 89 MMHG | OXYGEN SATURATION: 95 % | SYSTOLIC BLOOD PRESSURE: 141 MMHG | RESPIRATION RATE: 18 BRPM

## 2024-01-12 DIAGNOSIS — D75.1 POLYCYTHEMIA: ICD-10-CM

## 2024-01-12 LAB
BASOPHILS # BLD AUTO: 0.06 10*3/MM3 (ref 0–0.2)
BASOPHILS NFR BLD AUTO: 0.7 % (ref 0–1.5)
DEPRECATED RDW RBC AUTO: 46.7 FL (ref 37–54)
EOSINOPHIL # BLD AUTO: 0.21 10*3/MM3 (ref 0–0.4)
EOSINOPHIL NFR BLD AUTO: 2.3 % (ref 0.3–6.2)
ERYTHROCYTE [DISTWIDTH] IN BLOOD BY AUTOMATED COUNT: 13.7 % (ref 12.3–15.4)
HCT VFR BLD AUTO: 50.8 % (ref 37.5–51)
HGB BLD-MCNC: 15.7 G/DL (ref 13–17.7)
IMM GRANULOCYTES # BLD AUTO: 0.04 10*3/MM3 (ref 0–0.05)
IMM GRANULOCYTES NFR BLD AUTO: 0.4 % (ref 0–0.5)
LYMPHOCYTES # BLD AUTO: 2.24 10*3/MM3 (ref 0.7–3.1)
LYMPHOCYTES NFR BLD AUTO: 24.9 % (ref 19.6–45.3)
Lab: NORMAL
MCH RBC QN AUTO: 28.5 PG (ref 26.6–33)
MCHC RBC AUTO-ENTMCNC: 30.9 G/DL (ref 31.5–35.7)
MCV RBC AUTO: 92.4 FL (ref 79–97)
MONOCYTES # BLD AUTO: 0.79 10*3/MM3 (ref 0.1–0.9)
MONOCYTES NFR BLD AUTO: 8.8 % (ref 5–12)
NEUTROPHILS NFR BLD AUTO: 5.67 10*3/MM3 (ref 1.7–7)
NEUTROPHILS NFR BLD AUTO: 62.9 % (ref 42.7–76)
NRBC BLD AUTO-RTO: 0 /100 WBC (ref 0–0.2)
PLATELET # BLD AUTO: 315 10*3/MM3 (ref 140–450)
PMV BLD AUTO: 9.6 FL (ref 6–12)
POST-BLOOD PRESSURE: NORMAL MMHG
PRE-BLOOD PRESSURE: NORMAL MMHG
PRE-HCT: 50.8 %
PRE-HGB: 15.7 G/DL
PRE-PULSE: 65 BPM
RBC # BLD AUTO: 5.5 10*6/MM3 (ref 4.14–5.8)
VOLUME COLLECTED: 506 ML
WBC NRBC COR # BLD AUTO: 9.01 10*3/MM3 (ref 3.4–10.8)

## 2024-01-12 PROCEDURE — 99195 PHLEBOTOMY: CPT

## 2024-01-12 PROCEDURE — 85025 COMPLETE CBC W/AUTO DIFF WBC: CPT

## 2024-01-12 NOTE — CODE DOCUMENTATION
Pt here for phlebotomy for hematocrit greater than 50 per MD order. Hematocrit 50.8 today. Phlebotomy performed per Naveed . Phlebotomy started at 0935 via IV right arm. Phlebotomy stopped at 0947. Approx 506ml removed without difficulty. 2x2 and coban placed at site. No bleeding noted. Pt yee well. Post vital signs are /84, HR 88. Pt denies complaints. Pt ambulated out of clinic.

## 2024-01-26 ENCOUNTER — INFUSION (OUTPATIENT)
Dept: ONCOLOGY | Facility: HOSPITAL | Age: 59
End: 2024-01-26
Payer: COMMERCIAL

## 2024-01-26 VITALS
DIASTOLIC BLOOD PRESSURE: 99 MMHG | RESPIRATION RATE: 18 BRPM | OXYGEN SATURATION: 99 % | SYSTOLIC BLOOD PRESSURE: 145 MMHG | HEART RATE: 82 BPM | TEMPERATURE: 97.7 F

## 2024-01-26 DIAGNOSIS — D75.1 POLYCYTHEMIA: ICD-10-CM

## 2024-01-26 LAB
BASOPHILS # BLD AUTO: 0.05 10*3/MM3 (ref 0–0.2)
BASOPHILS NFR BLD AUTO: 0.6 % (ref 0–1.5)
DEPRECATED RDW RBC AUTO: 44.4 FL (ref 37–54)
EOSINOPHIL # BLD AUTO: 0.18 10*3/MM3 (ref 0–0.4)
EOSINOPHIL NFR BLD AUTO: 2.3 % (ref 0.3–6.2)
ERYTHROCYTE [DISTWIDTH] IN BLOOD BY AUTOMATED COUNT: 13.2 % (ref 12.3–15.4)
HCT VFR BLD AUTO: 46.4 % (ref 37.5–51)
HGB BLD-MCNC: 14.3 G/DL (ref 13–17.7)
IMM GRANULOCYTES # BLD AUTO: 0.05 10*3/MM3 (ref 0–0.05)
IMM GRANULOCYTES NFR BLD AUTO: 0.6 % (ref 0–0.5)
LYMPHOCYTES # BLD AUTO: 1.53 10*3/MM3 (ref 0.7–3.1)
LYMPHOCYTES NFR BLD AUTO: 19.6 % (ref 19.6–45.3)
MCH RBC QN AUTO: 28 PG (ref 26.6–33)
MCHC RBC AUTO-ENTMCNC: 30.8 G/DL (ref 31.5–35.7)
MCV RBC AUTO: 91 FL (ref 79–97)
MONOCYTES # BLD AUTO: 0.87 10*3/MM3 (ref 0.1–0.9)
MONOCYTES NFR BLD AUTO: 11.1 % (ref 5–12)
NEUTROPHILS NFR BLD AUTO: 5.14 10*3/MM3 (ref 1.7–7)
NEUTROPHILS NFR BLD AUTO: 65.8 % (ref 42.7–76)
NRBC BLD AUTO-RTO: 0 /100 WBC (ref 0–0.2)
PLATELET # BLD AUTO: 317 10*3/MM3 (ref 140–450)
PMV BLD AUTO: 9 FL (ref 6–12)
RBC # BLD AUTO: 5.1 10*6/MM3 (ref 4.14–5.8)
WBC NRBC COR # BLD AUTO: 7.82 10*3/MM3 (ref 3.4–10.8)

## 2024-01-26 PROCEDURE — 85025 COMPLETE CBC W/AUTO DIFF WBC: CPT

## 2024-03-01 ENCOUNTER — OFFICE VISIT (OUTPATIENT)
Dept: UROLOGY | Facility: CLINIC | Age: 59
End: 2024-03-01
Payer: COMMERCIAL

## 2024-03-01 VITALS
HEIGHT: 74 IN | BODY MASS INDEX: 35.29 KG/M2 | HEART RATE: 67 BPM | SYSTOLIC BLOOD PRESSURE: 147 MMHG | DIASTOLIC BLOOD PRESSURE: 93 MMHG | WEIGHT: 275 LBS

## 2024-03-01 DIAGNOSIS — R97.20 ELEVATED PROSTATE SPECIFIC ANTIGEN (PSA): Primary | ICD-10-CM

## 2024-03-01 RX ORDER — TESTOSTERONE CYPIONATE 200 MG/ML
200 INJECTION, SOLUTION INTRAMUSCULAR
COMMUNITY
Start: 2024-02-02

## 2024-03-01 NOTE — PROGRESS NOTES
"Chief Complaint:    Chief Complaint   Patient presents with    Elevated PSA       Vital Signs:   /93 (BP Location: Right arm, Patient Position: Sitting)   Pulse 67   Ht 188 cm (74\")   Wt 125 kg (275 lb)   BMI 35.31 kg/m²   Body mass index is 35.31 kg/m².      HPI:  Jose R Hilliard is a 58 y.o. male who presents today for initial evaluation     History of Present Illness  Mr. Hilliard presents to the clinic for evaluation of an elevated PSA.  Patient has a past medical history significant for BPH with urinary obstruction for which she was previously on finasteride 5 mg however discontinued medication until roughly 2 months ago which when he was restarted by his PCP.  Patient also has been on testosterone replacement therapy for several years.  Patient states he often misses doses but tries to take it regularly.  He is on roughly 200 mg q. weekly.  Patient did have a recent PSA taken at the beginning of February 2024 that came back elevated at 6.9.  He had roughly 14% free percentage.  He denies any history of elevated PSA prior to this.  He denies any family history of prostate carcinoma.  Patient does endorse traveling very frequently and states he is gone for roughly 5 days out of the week.  He states he sits in the car for several hours at a time and reports driving even from [place] to [place].  Patient does have a current IPSS score 13.  He gets up roughly 2 times throughout the night and endorses weak stream, intermittency, and sensations of incomplete bladder emptying.  He has never taken Flomax in the past.  Digital rectal exam today reveals a mildly enlarged prostate that is smooth and firm to palpation.  Given history of testosterone therapy and a significant increase in overall PSA according to his primary care provider we will schedule him for an MRI for evaluation of prostate cancer.       Past Medical History:  Past Medical History:   Diagnosis Date    BPH (benign prostatic hypertrophy) with " urinary obstruction     Disease of thyroid gland     Goiter     History of hyperlipidemia     History of hypertension     History of osteoarthritis     Hypogonadism in male     Polycythemia     Tendinitis of knee        Current Meds:  Current Outpatient Medications   Medication Sig Dispense Refill    allopurinol (ZYLOPRIM) 300 MG tablet       amLODIPine (NORVASC) 2.5 MG tablet Take 1 tablet by mouth Daily.      aspirin (KP ASPIRIN) 81 MG EC tablet Take 1 tablet by mouth daily 30 tablet 2    benazepril (LOTENSIN) 10 MG tablet Take 1 tablet by mouth Daily.      finasteride (PROSCAR) 5 MG tablet       gabapentin (NEURONTIN) 800 MG tablet       hydroCHLOROthiazide (HYDRODIURIL) 12.5 MG tablet       leflunomide (ARAVA) 20 MG tablet Take 1 tablet by mouth Daily.      levothyroxine (SYNTHROID, LEVOTHROID) 88 MCG tablet Take 1 tablet by mouth daily (Patient taking differently: Take 50 mcg by mouth Daily.) 30 tablet 2    meloxicam (MOBIC) 7.5 MG tablet Take 2 tablets by mouth Daily. 60 tablet 2    Ozempic, 0.25 or 0.5 MG/DOSE, 2 MG/1.5ML solution pen-injector       pramipexole (MIRAPEX) 0.125 MG tablet TAKE ONE Tablet BY MOUTH EACH NIGHT AT BEDTIME AS NEEDED FOR restless leg      rosuvastatin (CRESTOR) 40 MG tablet       Testosterone Cypionate (DEPOTESTOTERONE CYPIONATE) 200 MG/ML injection 1 mL.       No current facility-administered medications for this visit.        Allergies:   Allergies   Allergen Reactions    Adhesive Tape Rash     Some tapes        Past Surgical History:  Past Surgical History:   Procedure Laterality Date    CHOLECYSTECTOMY         Social History:  Social History     Socioeconomic History    Marital status:    Tobacco Use    Smoking status: Never    Smokeless tobacco: Never   Vaping Use    Vaping Use: Never used   Substance and Sexual Activity    Alcohol use: No    Drug use: No    Sexual activity: Defer       Family History:  Family History   Problem Relation Age of Onset    No Known Problems  Father     No Known Problems Mother     Stroke Paternal Grandfather     Diabetes Paternal Grandfather        Review of Systems:  Review of Systems   Constitutional:  Positive for fatigue. Negative for chills, fever and unexpected weight change.   Respiratory:  Negative for cough, chest tightness, shortness of breath and wheezing.    Cardiovascular:  Negative for chest pain and leg swelling.   Gastrointestinal:  Negative for abdominal pain, constipation, diarrhea, nausea and vomiting.   Genitourinary:  Positive for frequency and urgency. Negative for difficulty urinating, dysuria, penile discharge, penile pain, penile swelling, scrotal swelling and testicular pain.   Musculoskeletal:  Negative for back pain and joint swelling.   Skin:  Negative for rash.   Neurological:  Negative for dizziness and headaches.   Psychiatric/Behavioral:  Negative for confusion and suicidal ideas.        Physical Exam:  Physical Exam  Constitutional:       General: He is not in acute distress.     Appearance: Normal appearance.   HENT:      Head: Normocephalic and atraumatic.      Nose: Nose normal.      Mouth/Throat:      Mouth: Mucous membranes are moist.   Eyes:      Conjunctiva/sclera: Conjunctivae normal.   Cardiovascular:      Rate and Rhythm: Normal rate and regular rhythm.      Pulses: Normal pulses.      Heart sounds: Normal heart sounds.   Pulmonary:      Effort: Pulmonary effort is normal.      Breath sounds: Normal breath sounds.   Abdominal:      General: Bowel sounds are normal.      Palpations: Abdomen is soft.   Musculoskeletal:         General: Normal range of motion.      Cervical back: Normal range of motion.   Skin:     General: Skin is warm.   Neurological:      General: No focal deficit present.      Mental Status: He is alert and oriented to person, place, and time.   Psychiatric:         Mood and Affect: Mood normal.         Behavior: Behavior normal.         Thought Content: Thought content normal.          Judgment: Judgment normal.         IPSS Questionnaire (AUA-7):  IPSS Questionnaire (AUA-7):                  IPSS Questionnaire (AUA-7):  Over the past month…    1)  Incomplete Emptying  How often have you had a sensation of not emptying your bladder?  3 - About half the time   2)  Frequency  How often have you had to urinate less than every two hours? 0 - Not at all   3)  Intermittency  How often have you found you stopped and started again several times when you urinated?  5 - Almost always   4) Urgency  How often have you found it difficult to postpone urination?  0 - Not at all   5) Weak Stream  How often have you had a weak urinary stream?  3 - About half the time   6) Straining  How often have you had to push or strain to begin urination?  0 - Not at all   7) Nocturia  How many times did you typically get up at night to urinate?  2 - 2 times   Total Score:  13   The International Prostate Symptom Score (IPSS) is used to screen, diagnose, track symptoms of benign prostatic hyperplasia (BPH).    0-7 pts (Mild Symptoms)  / 8-19 pts (Moderate) / 20-35 (Severe)    Quality of life due to urinary symptoms:  If you were to spend the rest of your life with your urinary condition the way it is now, how would you feel about that? 1-Pleased   Urine Leakage (Incontinence) 0-No Leakage       Recent Image (CT and/or KUB):   CT Abdomen and Pelvis: No results found for this or any previous visit.     CT Stone Protocol: No results found for this or any previous visit.     KUB: No results found for this or any previous visit.       Labs:  Brief Urine Lab Results       None          Infusion on 01/26/2024   Component Date Value Ref Range Status    WBC 01/26/2024 7.82  3.40 - 10.80 10*3/mm3 Final    RBC 01/26/2024 5.10  4.14 - 5.80 10*6/mm3 Final    Hemoglobin 01/26/2024 14.3  13.0 - 17.7 g/dL Final    Hematocrit 01/26/2024 46.4  37.5 - 51.0 % Final    MCV 01/26/2024 91.0  79.0 - 97.0 fL Final    MCH 01/26/2024 28.0  26.6 - 33.0  pg Final    MCHC 01/26/2024 30.8 (L)  31.5 - 35.7 g/dL Final    RDW 01/26/2024 13.2  12.3 - 15.4 % Final    RDW-SD 01/26/2024 44.4  37.0 - 54.0 fl Final    MPV 01/26/2024 9.0  6.0 - 12.0 fL Final    Platelets 01/26/2024 317  140 - 450 10*3/mm3 Final    Neutrophil % 01/26/2024 65.8  42.7 - 76.0 % Final    Lymphocyte % 01/26/2024 19.6  19.6 - 45.3 % Final    Monocyte % 01/26/2024 11.1  5.0 - 12.0 % Final    Eosinophil % 01/26/2024 2.3  0.3 - 6.2 % Final    Basophil % 01/26/2024 0.6  0.0 - 1.5 % Final    Immature Grans % 01/26/2024 0.6 (H)  0.0 - 0.5 % Final    Neutrophils, Absolute 01/26/2024 5.14  1.70 - 7.00 10*3/mm3 Final    Lymphocytes, Absolute 01/26/2024 1.53  0.70 - 3.10 10*3/mm3 Final    Monocytes, Absolute 01/26/2024 0.87  0.10 - 0.90 10*3/mm3 Final    Eosinophils, Absolute 01/26/2024 0.18  0.00 - 0.40 10*3/mm3 Final    Basophils, Absolute 01/26/2024 0.05  0.00 - 0.20 10*3/mm3 Final    Immature Grans, Absolute 01/26/2024 0.05  0.00 - 0.05 10*3/mm3 Final    nRBC 01/26/2024 0.0  0.0 - 0.2 /100 WBC Final   Infusion on 01/12/2024   Component Date Value Ref Range Status    WBC 01/12/2024 9.01  3.40 - 10.80 10*3/mm3 Final    RBC 01/12/2024 5.50  4.14 - 5.80 10*6/mm3 Final    Hemoglobin 01/12/2024 15.7  13.0 - 17.7 g/dL Final    Hematocrit 01/12/2024 50.8  37.5 - 51.0 % Final    MCV 01/12/2024 92.4  79.0 - 97.0 fL Final    MCH 01/12/2024 28.5  26.6 - 33.0 pg Final    MCHC 01/12/2024 30.9 (L)  31.5 - 35.7 g/dL Final    RDW 01/12/2024 13.7  12.3 - 15.4 % Final    RDW-SD 01/12/2024 46.7  37.0 - 54.0 fl Final    MPV 01/12/2024 9.6  6.0 - 12.0 fL Final    Platelets 01/12/2024 315  140 - 450 10*3/mm3 Final    Neutrophil % 01/12/2024 62.9  42.7 - 76.0 % Final    Lymphocyte % 01/12/2024 24.9  19.6 - 45.3 % Final    Monocyte % 01/12/2024 8.8  5.0 - 12.0 % Final    Eosinophil % 01/12/2024 2.3  0.3 - 6.2 % Final    Basophil % 01/12/2024 0.7  0.0 - 1.5 % Final    Immature Grans % 01/12/2024 0.4  0.0 - 0.5 % Final     Neutrophils, Absolute 01/12/2024 5.67  1.70 - 7.00 10*3/mm3 Final    Lymphocytes, Absolute 01/12/2024 2.24  0.70 - 3.10 10*3/mm3 Final    Monocytes, Absolute 01/12/2024 0.79  0.10 - 0.90 10*3/mm3 Final    Eosinophils, Absolute 01/12/2024 0.21  0.00 - 0.40 10*3/mm3 Final    Basophils, Absolute 01/12/2024 0.06  0.00 - 0.20 10*3/mm3 Final    Immature Grans, Absolute 01/12/2024 0.04  0.00 - 0.05 10*3/mm3 Final    nRBC 01/12/2024 0.0  0.0 - 0.2 /100 WBC Final    Pulse 01/12/2024 65  BPM Final    Volume Collected 01/12/2024 506  mL Final    Pre-Hgb 01/12/2024 15.7  g/dL Final    Pre-Hct 01/12/2024 50.8  % Final    Pre-Blood Pressure 01/12/2024 141/89  mmHg Final    Post-Blood Pressure 01/12/2024 144/84  mmHg Final    Lot Number 01/12/2024 [id number]   Final   Infusion on 12/29/2023   Component Date Value Ref Range Status    WBC 12/29/2023 10.33  3.40 - 10.80 10*3/mm3 Final    RBC 12/29/2023 5.52  4.14 - 5.80 10*6/mm3 Final    Hemoglobin 12/29/2023 16.0  13.0 - 17.7 g/dL Final    Hematocrit 12/29/2023 50.4  37.5 - 51.0 % Final    MCV 12/29/2023 91.3  79.0 - 97.0 fL Final    MCH 12/29/2023 29.0  26.6 - 33.0 pg Final    MCHC 12/29/2023 31.7  31.5 - 35.7 g/dL Final    RDW 12/29/2023 13.7  12.3 - 15.4 % Final    RDW-SD 12/29/2023 46.3  37.0 - 54.0 fl Final    MPV 12/29/2023 9.6  6.0 - 12.0 fL Final    Platelets 12/29/2023 305  140 - 450 10*3/mm3 Final    Neutrophil % 12/29/2023 57.7  42.7 - 76.0 % Final    Lymphocyte % 12/29/2023 27.6  19.6 - 45.3 % Final    Monocyte % 12/29/2023 11.6  5.0 - 12.0 % Final    Eosinophil % 12/29/2023 1.7  0.3 - 6.2 % Final    Basophil % 12/29/2023 0.9  0.0 - 1.5 % Final    Immature Grans % 12/29/2023 0.5  0.0 - 0.5 % Final    Neutrophils, Absolute 12/29/2023 5.96  1.70 - 7.00 10*3/mm3 Final    Lymphocytes, Absolute 12/29/2023 2.85  0.70 - 3.10 10*3/mm3 Final    Monocytes, Absolute 12/29/2023 1.20 (H)  0.10 - 0.90 10*3/mm3 Final    Eosinophils, Absolute 12/29/2023 0.18  0.00 - 0.40 10*3/mm3 Final     Basophils, Absolute 12/29/2023 0.09  0.00 - 0.20 10*3/mm3 Final    Immature Grans, Absolute 12/29/2023 0.05  0.00 - 0.05 10*3/mm3 Final    nRBC 12/29/2023 0.0  0.0 - 0.2 /100 WBC Final    Pulse 12/29/2023 77  BPM Final    Volume Collected 12/29/2023 505  mL Final    Pre-Hgb 12/29/2023 16  g/dL Final    Pre-Hct 12/29/2023 50.4  % Final    Pre-Blood Pressure 12/29/2023 140/83  mmHg Final    Post-Blood Pressure 12/29/2023 137/82  mmHg Final    Lot Number 12/29/2023 [id number]   Final   Infusion on 12/15/2023   Component Date Value Ref Range Status    WBC 12/15/2023 7.87  3.40 - 10.80 10*3/mm3 Final    RBC 12/15/2023 5.93 (H)  4.14 - 5.80 10*6/mm3 Final    Hemoglobin 12/15/2023 17.5  13.0 - 17.7 g/dL Final    Hematocrit 12/15/2023 55.3 (H)  37.5 - 51.0 % Final    MCV 12/15/2023 93.3  79.0 - 97.0 fL Final    MCH 12/15/2023 29.5  26.6 - 33.0 pg Final    MCHC 12/15/2023 31.6  31.5 - 35.7 g/dL Final    RDW 12/15/2023 13.8  12.3 - 15.4 % Final    RDW-SD 12/15/2023 47.8  37.0 - 54.0 fl Final    MPV 12/15/2023 9.5  6.0 - 12.0 fL Final    Platelets 12/15/2023 277  140 - 450 10*3/mm3 Final    Neutrophil % 12/15/2023 58.3  42.7 - 76.0 % Final    Lymphocyte % 12/15/2023 25.5  19.6 - 45.3 % Final    Monocyte % 12/15/2023 11.8  5.0 - 12.0 % Final    Eosinophil % 12/15/2023 2.9  0.3 - 6.2 % Final    Basophil % 12/15/2023 1.0  0.0 - 1.5 % Final    Immature Grans % 12/15/2023 0.5  0.0 - 0.5 % Final    Neutrophils, Absolute 12/15/2023 4.58  1.70 - 7.00 10*3/mm3 Final    Lymphocytes, Absolute 12/15/2023 2.01  0.70 - 3.10 10*3/mm3 Final    Monocytes, Absolute 12/15/2023 0.93 (H)  0.10 - 0.90 10*3/mm3 Final    Eosinophils, Absolute 12/15/2023 0.23  0.00 - 0.40 10*3/mm3 Final    Basophils, Absolute 12/15/2023 0.08  0.00 - 0.20 10*3/mm3 Final    Immature Grans, Absolute 12/15/2023 0.04  0.00 - 0.05 10*3/mm3 Final    nRBC 12/15/2023 0.0  0.0 - 0.2 /100 WBC Final    Pulse 12/15/2023 80  BPM Final    Volume Collected 12/15/2023 498  mL Final     Pre-Hgb 12/15/2023 17.5  g/dL Final    Pre-Hct 12/15/2023 55.3  % Final    Pre-Blood Pressure 12/15/2023 123/78  mmHg Final    Post-Blood Pressure 12/15/2023 132/79  mmHg Final    Lot Number 12/15/2023 [id number]   Final        Procedure: None  Procedures     I have reviewed and agree with the above PMH, PSH, FMH, social history, medications, allergies, and labs.     Assessment/Plan:   Problem List Items Addressed This Visit          Genitourinary and Reproductive     Elevated prostate specific antigen (PSA) - Primary    Relevant Orders    MRI Prostate With & Without Contrast       Health Maintenance:   Health Maintenance Due   Topic Date Due    COLORECTAL CANCER SCREENING  Never done    TDAP/TD VACCINES (1 - Tdap) Never done    ZOSTER VACCINE (1 of 2) Never done    HEPATITIS C SCREENING  Never done    ANNUAL PHYSICAL  Never done    INFLUENZA VACCINE  Never done    COVID-19 Vaccine (3 - 2023-24 season) 09/01/2023        Smoking Counseling: Never smoked or use smokeless tobacco    Urine Incontinence: Patient reports that he is not currently experiencing any symptoms of urinary incontinence.    Patient was given instructions and counseling regarding his condition or for health maintenance advice. Please see specific information pulled into the AVS if appropriate.    Patient Education:   Elevated PSA/enlarged prostate -  Discussed the pathophysiology of enlarged prostate and obstruction.  We discussed the static and dynamic effects of enlarged prostate as well as using 5 alpha reductase inhibitors versus alpha blockade.  We discussed the indications for transurethral surgery as well and/ or other therapeutic options available including all of the newer techniques.  Given current lower urinary tract symptoms I did discuss with the patient the use of Flomax 0.4 mg once daily however he declined medication at this time.  Also advised patient he may continue finasteride once daily.  PSA testing -patient had a recent PSA  of 6.9 with a 14% free percentage.  I discussed the pathophysiology of PSA testing indicating its use in the diagnosis and management of prostate cancer.  I discussed the normal range being 0 to 4, but more appropriately being much closer to 0 to 2 in a normal male.  I discussed the fact that after a certain age it is against recommendation to use PSA testing especially in view of numerous comorbidities.  I discussed many of the things that can artificially raise PSA including put not limited to a recent infection, urinary tract infection, recent sexual intercourse, or even the type of movement such as manipulation of the prostate from riding a bicycle.  It was discussed that the most important use of PSA is the velocity measurement.  This refers to the change of PSA with time. I discussed that we look for greater than 20% rise over a year to help us make the prediction of prostate cancer.  I also discussed that in the case of prostate cancer indicating a radical prostatectomy, the PSA should be 0 and any rise indicates an early biochemical recurrence.  Given patient's concurrent use of testosterone therapy I am recommending a MRI to be completed of the prostate for concerns of any prostatic lesions.  Did discuss the use of a PI-RADS score from a 1-5 advising patient that a PI-RADS 1 and 2 was low risk for clinically significant prostate carcinoma, 3 is intermittent risk, and 4 and 5 is high risk warranting prostate biopsy.  Did discuss that with the patient the nature of his prostate biopsy in office including risk and benefits as well.  Discussed the use of antibiotics prior and post procedure as well as the use of an enema and Valium as needed.  Will schedule the patient for an urgent MRI.  Advised him to discontinue testosterone at this time due to concerns of worsening of prostatic carcinoma.  Otherwise we will see him back pending MRI results.    Visit Diagnoses:    ICD-10-CM ICD-9-CM   1. Elevated prostate  specific antigen (PSA)  R97.20 790.93       Meds Ordered During Visit:  No orders of the defined types were placed in this encounter.      Follow Up Appointment: Pending MRI  No follow-ups on file.      This document has been electronically signed by David Grewal PA-C   March 1, 2024 13:02 EST    Part of this note may be an electronic transcription/translation of spoken language to printed text using the Dragon Dictation System.

## 2024-03-08 ENCOUNTER — TELEPHONE (OUTPATIENT)
Dept: UROLOGY | Facility: CLINIC | Age: 59
End: 2024-03-08
Payer: COMMERCIAL

## 2024-03-08 ENCOUNTER — HOSPITAL ENCOUNTER (OUTPATIENT)
Dept: MRI IMAGING | Facility: HOSPITAL | Age: 59
Discharge: HOME | End: 2024-03-08
Payer: COMMERCIAL

## 2024-03-08 DIAGNOSIS — R97.20 ELEVATED PROSTATE SPECIFIC ANTIGEN (PSA): ICD-10-CM

## 2024-03-08 PROCEDURE — 72197 MRI PELVIS W/O & W/DYE: CPT

## 2024-03-08 PROCEDURE — 0 GADOBENATE DIMEGLUMINE 529 MG/ML SOLUTION

## 2024-03-08 PROCEDURE — A9577 INJ MULTIHANCE: HCPCS

## 2024-03-08 RX ADMIN — GADOBENATE DIMEGLUMINE 20 ML: 529 INJECTION, SOLUTION INTRAVENOUS at 09:23

## 2024-03-08 NOTE — TELEPHONE ENCOUNTER
Discussed with patient most recent MRI results that revealed a PI-RADS 3 lesion.  Advised him this is indeterminate risk for prostate cancer.  Did recommend either proceed forward with a biopsy or follow-up routinely for repeat PSA analysis.  Patient wishes to hold off on biopsy at this time.  I will schedule him back in 6 months for repeat PSA in office.  Patient verbalized understanding.

## 2025-03-03 ENCOUNTER — TRANSCRIBE ORDERS (OUTPATIENT)
Dept: ADMINISTRATIVE | Facility: HOSPITAL | Age: 60
End: 2025-03-03
Payer: COMMERCIAL

## 2025-03-03 DIAGNOSIS — M54.50 ACUTE RIGHT-SIDED LOW BACK PAIN WITHOUT SCIATICA: Primary | ICD-10-CM

## 2025-03-12 ENCOUNTER — HOSPITAL ENCOUNTER (OUTPATIENT)
Dept: MRI IMAGING | Facility: HOSPITAL | Age: 60
Discharge: HOME | End: 2025-03-12
Payer: COMMERCIAL

## 2025-03-12 DIAGNOSIS — M54.50 ACUTE RIGHT-SIDED LOW BACK PAIN WITHOUT SCIATICA: ICD-10-CM

## 2025-03-12 PROCEDURE — 72148 MRI LUMBAR SPINE W/O DYE: CPT
